# Patient Record
(demographics unavailable — no encounter records)

---

## 2024-10-17 NOTE — PHYSICAL EXAM
[de-identified] : bilateral breasts soft and symmetrical no signs of infection or palpable fluid collections noted nipples viable drains in place and functioning, drain sites without erythema, drainage serosanguinous  incisions c/d/i breast skin flaps with normal capillary refill and warm surgical tape and glue intact minimal swelling [de-identified] : incisions c/d/i abdomen soft and nontender umbilicus viable surgical tape intact drains in place and functioning, drain sites without erythema, drainage serosanguinous  no palpable fluid collections or signs of infection noted skin with normal capillary refill, skin warm to the touch

## 2024-10-17 NOTE — ASSESSMENT
[FreeTextEntry1] : Patient is doing well and healing as expected Restrictions discussed with patient today. Restrictions include limiting upper extremity stretching or movements, no heavy lifting (> 10 pounds), no side sleeping for 3-4 weeks, no strenuous activities or exercise, no swimming Patient may shower Walking strongly encouraged All drains removed today without difficulties. Site was covered with gauze and tegaderm. Patient may shower normally and can remove waterproof bandage in 2 days. After 2 days, the site may be covered with a small amount of bacitracin and bandaid for an additional couple days after to ensure healing. monitor for redness, swelling, fever, chills no heavy lifting or strenuous activity continue to wear soft, non wire bra she is encouraged to call if there are any changes RTO in 2 weeks all pt questions answered

## 2024-10-17 NOTE — PHYSICAL EXAM
[de-identified] : bilateral breasts soft and symmetrical no signs of infection or palpable fluid collections noted nipples viable drains in place and functioning, drain sites without erythema, drainage serosanguinous  incisions c/d/i breast skin flaps with normal capillary refill and warm surgical tape and glue intact minimal swelling [de-identified] : incisions c/d/i abdomen soft and nontender umbilicus viable surgical tape intact drains in place and functioning, drain sites without erythema, drainage serosanguinous  no palpable fluid collections or signs of infection noted skin with normal capillary refill, skin warm to the touch

## 2024-10-17 NOTE — HISTORY OF PRESENT ILLNESS
[FreeTextEntry1] : Ms. GRACE GRAY  is a 60 year  old female  with past medical history of a thyroid mass, HTN and high cholesterol who presents with newly diagnosed left breast cancer.  Pt was diagnosed on a screening mammogram August 2024, and later confirmed with ultrasound guided core biopsy 8/5/24. pt was referred to the office by Dr Pelayo to discuss her reconstructive options.   She is now s/p bilateral breast reconstruction with YESICA flaps Doing well Drains: all < 20 cc

## 2024-10-19 NOTE — PROCEDURE
[FreeTextEntry3] : Ultrasound-guided placement of Marissa localizing clip into the left axillary lymph node  After informed consent obtained, 1% lidocaine was infiltrated to the skin and a Marissa localizing clip was placed into the left axillary lymph node  No procedure related complications were noted

## 2024-10-19 NOTE — HISTORY OF PRESENT ILLNESS
[FreeTextEntry1] : Patient returns to the office for interval assessment  She has a history for left breast cancer with metastasis to left axillary lymph node  A Marissa localizing clip will be placed into the left axillary lymph node for facilitation of node dissection  Care plan reviewed  The relative risks and benefits were discussed

## 2024-10-19 NOTE — PHYSICAL EXAM
[Normocephalic] : normocephalic [Sclera nonicteric] : sclera nonicteric [No Supraclavicular Adenopathy] : no supraclavicular adenopathy [Clear to Auscultation Bilat] : clear to auscultation bilaterally [No Rashes] : no rashes [No Ulceration] : no ulceration [de-identified] : Palpable mass lower outer left breast

## 2024-10-19 NOTE — PHYSICAL EXAM
[Normocephalic] : normocephalic [Sclera nonicteric] : sclera nonicteric [No Supraclavicular Adenopathy] : no supraclavicular adenopathy [Clear to Auscultation Bilat] : clear to auscultation bilaterally [No Rashes] : no rashes [No Ulceration] : no ulceration [de-identified] : Palpable mass lower outer left breast

## 2024-10-19 NOTE — ASSESSMENT
[FreeTextEntry1] : Left breast cancer with metastasis to left axillary lymph node  A Marissa localizing clip was placed into the metastatic node  No procedure related complications were noted  The relative risks and benefits of surgery were discussed  A total of 45 minutes was spent in consultation

## 2024-10-21 NOTE — HISTORY OF PRESENT ILLNESS
[FreeTextEntry1] : Ms. Elizalde presents today for consideration for radiation therapy for breast cancer, referred by Dr. Pelayo.  Bilateral breast ultrasound done on 7/31/2024 showed: In the area of palpable concern in the left 4 oclock axis, 5-7 cm from the nipple there are 2 irregular hypoechoic solid masses measuring 1.5 and 1.7 cm with suspicious morphologic features corresponding with the spiculated masses on mammography. Abnormal hypoechoic left axillary node.   8/5/2024 left breast biopsy showed: 1.  Breast, core biopsy (LEFT, 5:00):      -   INFILTRATING DUCTAL CARCINOMA, Disha score 3+ 2+ 2 = 7/9         ER/[place]+, Her2 neg. 2.  Lymph node, core biopsy (LEFT axillary):      -   METASTATIC CARCINOMA.  PET scan 8/20/2024 showed: 1. FDG avid left breast cancer and metastatic left axillary lymphadenopathy. No distant FDG avid disease. 2. Incidental note of FDG avid nodule in the region of the posterior left thyroid. Recommend correlation with ultrasound to evaluate for signs of thyroid malignancy.  thyroid ultrasound 8/28/2024 showed: Multiple thyroid nodules consistent with multinodular goiter. A 1.3 cm isoechoic nodule in the mid to lower pole of the left lobe which likely corresponds to the FDG avid lesion seen on the prior PET/CT. Since it is FDG avid, consider further evaluation with ultrasound-guided fine-needle aspiration  10/10/2024 she underwent bilateral total mastectomy (Dr Pelayo) and breast reconstruction with YESICA flaps (Dr Benito). Pathology revealed: 1.  Breast, right, total mastectomy-  Benign 2.  Lymph node, left axilla, biopsy -   One lymph node negative for metastatic carcinoma (0/1). 3.  Axillary fat pad, left, dissection -   Four of twelve lymph nodes positive for metastatic carcinoma (4/12). -   The largest metastatic focus measures 22.0 mm in greatest dimension. -   Extranodal extension is present (extent 5.0 mm). 4.  Breast, left, posterior margin, biopsy -   Benign fibroadipose tissue. 5.  Breast, left, total mastectomy -   Invasive ductal carcinoma, Disha grade 2 (tubule formation: 3, nuclear pleomorphism: 2, mitotic rate: 2; total score 7/9). -   The invasive tumor measures 37.0 mm in greatest dimension. -   Ductal carcinoma in situ, nuclear grade 2, cribriform, micropapillary and papillary types with necrosis and calcifications.  The DCIS spans a distance of approximately 40.0 mm in greatest dimension and is present in 4 of 9 slides. -   No lymphovascular invasion identified. -   The resection margins are negative for carcinoma.  The invasive tumor is 2.0 mm from the nearest margin (posterior) and 3.0 mm from the anterior margin.  DCIS is 6.0 mm from the nearest margin (posterior). -   Nipple and skin negative for carcinoma. -   Complex sclerosing lesion, focally involved by DCIS. -   Intraductal papillomata, focally involved by DCIS. 6.  Internal mammary lymph node, right, biopsy -   Three lymph nodes negative for metastatic carcinoma (0/3). 7.  Internal mammary lymph node, left, biopsy -   One lymph node negative for metastatic carcinoma (0/1).  To see Dr. Hernandez of med onc on 11/5.   Feeling well.  Only complaint is of postsurgical tenderness.  Using a pillow for splinting as needed.  awaiting clearance from Dr Pelayo to begin ROJM.  right arm > left arm.   Tylenol alt with ibuprofen q 4 hrs. x 3 days completed.  antibiotic regiment completed.  Currently using Tylenol ER  500mg 2 tabs PO 1-2 x daily with good relief.  sleeping in recliner with pillow to splint.  Reviewed with patient proper technique for using Incentive spirometer.  Teach back was correct.

## 2024-10-21 NOTE — PHYSICAL EXAM
[Symmetric] : breasts are symmetric [Breast Palpation Mass] : no palpable masses [No UE Edema] : there is no upper extremity edema [Normal] : oriented to person, place and time, the affect was normal, the mood was normal and not anxious [de-identified] : Bilateral mastectomy with YESICA reconstruction.

## 2024-10-21 NOTE — PHYSICAL EXAM
[Symmetric] : breasts are symmetric [Breast Palpation Mass] : no palpable masses [No UE Edema] : there is no upper extremity edema [Normal] : oriented to person, place and time, the affect was normal, the mood was normal and not anxious [de-identified] : Bilateral mastectomy with YESICA reconstruction.

## 2024-10-21 NOTE — HISTORY OF PRESENT ILLNESS
[FreeTextEntry1] : Ms. Elizalde presents today for consideration for radiation therapy for breast cancer, referred by Dr. Pelayo.  Bilateral breast ultrasound done on 7/31/2024 showed: In the area of palpable concern in the left 4 oclock axis, 5-7 cm from the nipple there are 2 irregular hypoechoic solid masses measuring 1.5 and 1.7 cm with suspicious morphologic features corresponding with the spiculated masses on mammography. Abnormal hypoechoic left axillary node.   8/5/2024 left breast biopsy showed: 1.  Breast, core biopsy (LEFT, 5:00):      -   INFILTRATING DUCTAL CARCINOMA, Disha score 3+ 2+ 2 = 7/9         ER/UT+, Her2 neg. 2.  Lymph node, core biopsy (LEFT axillary):      -   METASTATIC CARCINOMA.  PET scan 8/20/2024 showed: 1. FDG avid left breast cancer and metastatic left axillary lymphadenopathy. No distant FDG avid disease. 2. Incidental note of FDG avid nodule in the region of the posterior left thyroid. Recommend correlation with ultrasound to evaluate for signs of thyroid malignancy.  thyroid ultrasound 8/28/2024 showed: Multiple thyroid nodules consistent with multinodular goiter. A 1.3 cm isoechoic nodule in the mid to lower pole of the left lobe which likely corresponds to the FDG avid lesion seen on the prior PET/CT. Since it is FDG avid, consider further evaluation with ultrasound-guided fine-needle aspiration  10/10/2024 she underwent bilateral total mastectomy (Dr Pelayo) and breast reconstruction with YESICA flaps (Dr Benito). Pathology revealed: 1.  Breast, right, total mastectomy-  Benign 2.  Lymph node, left axilla, biopsy -   One lymph node negative for metastatic carcinoma (0/1). 3.  Axillary fat pad, left, dissection -   Four of twelve lymph nodes positive for metastatic carcinoma (4/12). -   The largest metastatic focus measures 22.0 mm in greatest dimension. -   Extranodal extension is present (extent 5.0 mm). 4.  Breast, left, posterior margin, biopsy -   Benign fibroadipose tissue. 5.  Breast, left, total mastectomy -   Invasive ductal carcinoma, Disha grade 2 (tubule formation: 3, nuclear pleomorphism: 2, mitotic rate: 2; total score 7/9). -   The invasive tumor measures 37.0 mm in greatest dimension. -   Ductal carcinoma in situ, nuclear grade 2, cribriform, micropapillary and papillary types with necrosis and calcifications.  The DCIS spans a distance of approximately 40.0 mm in greatest dimension and is present in 4 of 9 slides. -   No lymphovascular invasion identified. -   The resection margins are negative for carcinoma.  The invasive tumor is 2.0 mm from the nearest margin (posterior) and 3.0 mm from the anterior margin.  DCIS is 6.0 mm from the nearest margin (posterior). -   Nipple and skin negative for carcinoma. -   Complex sclerosing lesion, focally involved by DCIS. -   Intraductal papillomata, focally involved by DCIS. 6.  Internal mammary lymph node, right, biopsy -   Three lymph nodes negative for metastatic carcinoma (0/3). 7.  Internal mammary lymph node, left, biopsy -   One lymph node negative for metastatic carcinoma (0/1).  To see Dr. Hernandez of med onc on 11/5.   Feeling well.  Only complaint is of postsurgical tenderness.  Using a pillow for splinting as needed.  awaiting clearance from Dr Pelayo to begin ROJM.  right arm > left arm.   Tylenol alt with ibuprofen q 4 hrs. x 3 days completed.  antibiotic regiment completed.  Currently using Tylenol ER  500mg 2 tabs PO 1-2 x daily with good relief.  sleeping in recliner with pillow to splint.  Reviewed with patient proper technique for using Incentive spirometer.  Teach back was correct.

## 2024-10-21 NOTE — DISEASE MANAGEMENT
[Pathological] : TNM Stage: p [IIA] : IIA [FreeTextEntry4] : LEFT breast cancer, ER/CA+, Her2 neg [TTNM] : 2 [NTNM] : 2a [MTNM] : 0

## 2024-10-21 NOTE — DISEASE MANAGEMENT
[Pathological] : TNM Stage: p [IIA] : IIA [FreeTextEntry4] : LEFT breast cancer, ER/MD+, Her2 neg [TTNM] : 2 [NTNM] : 2a [MTNM] : 0

## 2024-10-21 NOTE — VITALS
[Least Pain Intensity: 0/10] : 0/10 [OTC] : OTC [Date: ____________] : Patient's last distress assessment performed on [unfilled]. [0 - No Distress] : Distress Level: 0 [Maximal Pain Intensity: 3/10] : 3/10 [80: Normal activity with effort; some signs or symptoms of disease.] : 80: Normal activity with effort; some signs or symptoms of disease.

## 2024-10-31 NOTE — ASSESSMENT
[FreeTextEntry1] : Patient is doing well and healing as expected discussed sending patient for left breast US for seroma evaluation  Bilateral cook doppler wires removed today without difficulties Restrictions discussed with patient today. Restrictions include limiting upper extremity stretching or movements, no heavy lifting (> 10 pounds), no side sleeping for 3-4 weeks, no strenuous activities or exercise, no swimming Patient may shower Walking strongly encouraged monitor for redness, swelling, fever, chills no heavy lifting or strenuous activity continue to wear soft, non wire bra she is encouraged to call if there are any changes RTO in 3 weeks all pt questions answered

## 2024-10-31 NOTE — HISTORY OF PRESENT ILLNESS
[FreeTextEntry1] : Ms. GRACE GRAY  is a 60 year  old female  with past medical history of a thyroid mass, HTN and high cholesterol who presents with newly diagnosed left breast cancer.  Pt was diagnosed on a screening mammogram August 2024, and later confirmed with ultrasound guided core biopsy 8/5/24. pt was referred to the office by Dr Pelayo to discuss her reconstructive options.   She is now s/p bilateral breast reconstruction with YESICA flaps Doing well, she is 3 weeks post op today Here for cook doppler wire removals

## 2024-10-31 NOTE — PHYSICAL EXAM
[NI] : Normal [de-identified] : bilateral breasts soft  left breast slightly larger than right no signs of infection or palpable fluid collections noted nipples viable incisions c/d/i breast skin flaps with normal capillary refill and warm surgical tape and glue intact minimal swelling [de-identified] : incisions c/d/i abdomen soft and nontender umbilicus viable surgical tape intact no palpable fluid collections or signs of infection noted skin with normal capillary refill, skin warm to the touch

## 2024-10-31 NOTE — PHYSICAL EXAM
[NI] : Normal [de-identified] : bilateral breasts soft  left breast slightly larger than right no signs of infection or palpable fluid collections noted nipples viable incisions c/d/i breast skin flaps with normal capillary refill and warm surgical tape and glue intact minimal swelling [de-identified] : incisions c/d/i abdomen soft and nontender umbilicus viable surgical tape intact no palpable fluid collections or signs of infection noted skin with normal capillary refill, skin warm to the touch

## 2024-11-06 NOTE — PHYSICAL EXAM
[Obese] : obese [Normal] : affect appropriate [de-identified] : no lymphedema  [de-identified] : s/p bilat mastectomies with YESICA reconstruction and L ALND - incisions healing well  [de-identified] : low transverse incision healing well  [de-identified] : no lymphedema  [de-identified] : Limping slightly - twisted R knee two days ago ( improving)  - has soft brace

## 2024-11-06 NOTE — ASSESSMENT
[FreeTextEntry1] : 59 y/o female with left breast cancer - invasive ductal  mod diff strongly ER and [place] positive, HER 2 negative , metastatic to 4/ 17  axillary  LNs with extracapsular extension p T2  pN2a  prognostic stage II A s/p bilateral mastetcomies L ALND and YESICA reconstruction on 10/10/24 ( Dr Pelayo, Dr Benito)   Discussed diagnosis, prognosis, explained relatively high risk of systemic recurrence and rationale, benefits of adjuvant systemic therapy. Discusse side efefcts of chemotherapy, ligistics, schedules, monitoring.  Plan :  Adjuvant chemotherapy DD AC-T followed by adjuvant  radiation, hormonal therapy - AI x 10 yeras and CDK4/6 inhibitor x 2-3 years. Plan to start chemotherapy in about 2 weeks.  echocardiogram [facility]  Chemotherapy education meeting  Nutritionist referral  ( patient was taking multiple supplements  and she has questions re best nutrition during chemotherapy)   D/w patient, her  and her daughter.    CC: Dr Homero Smith

## 2024-11-06 NOTE — HISTORY OF PRESENT ILLNESS
[Disease: _____________________] : Disease: [unfilled] [T: ___] : T[unfilled] [N: ___] : N[unfilled] [AJCC Stage: ____] : AJCC Stage: [unfilled] [de-identified] : 59 y/o female with recently diagnoseed let breast cancer. Presented with an abnormal screening mammogram   Presented in July 2024 with a palpable mass in the left breast. Mammogram  and sono 7/31/2024: In the area of palpable concern in the left 4 oclock axis, 5-7 cm from the nipple there are 2 irregular hypoechoic solid masses measuring 1.5 and 1.7 cm with suspicious morphologic features corresponding with the spiculated masses on mammography. Abnormal hypoechoic left axillary node.  8/5/2024 left breast core biopsy (LEFT, 5:00):  - INFILTRATING DUCTAL CARCINOMA, [place] score 3+ 2+ 2 = 7/9   ER/MS+, Her2 neg. 2. Lymph node, core biopsy (LEFT axillary):  - METASTATIC CARCINOMA.  PET scan 8/20/2024 showed: 1. FDG avid left breast cancer and metastatic left axillary lymphadenopathy. No distant FDG avid disease. 2. Incidental note of FDG avid nodule in the region of the posterior left thyroid. Recommend correlation with ultrasound to evaluate for signs of thyroid malignancy.  thyroid ultrasound 8/28/2024 :  Multiple thyroid nodules consistent with multinodular goiter. A 1.3 cm isoechoic nodule in the mid to lower pole of the left lobe which likely corresponds to the FDG avid lesion seen on the prior PET/CT. Since it is FDG avid, consider further evaluation with ultrasound-guided fine-needle aspiration  10/10/2024 she underwent bilateral  mastectomy (Dr Pelayo) and breast reconstruction with YESICA flaps (Dr Benito).  Pathology revealed: Breast, right, total mastectomy- Benign Lymph node, left axilla, biopsy - One lymph node negative for metastatic carcinoma (0/1). Axillary fat pad, left, dissection  - Four of twelve lymph nodes positive for metastatic carcinoma  (4/12). - The largest metastatic focus measures 22.0 mm in greatest dimension. - Extranodal extension is present (extent 5.0 mm). Breast, left, total mastectomy - Invasive ductal carcinoma, [place] grade 2 (tubule formation: 3, nuclear pleomorphism: 2, mitotic rate: 2; total score 7/9). - The invasive tumor measures 37.0 mm in greatest dimension. - Ductal carcinoma in situ, nuclear grade 2, cribriform, micropapillary and papillary types with necrosis and calcifications.  - The resection margins are negative for carcinoma. The invasive tumor is 2.0 mm from the nearest margin (posterior) and 3.0 mm from the anterior margin. DCIS is 6.0 mm from the nearest margin (posterior). Internal mammary lymph node, right, biopsy:   Internal mammary lymph node, left, biopsy - One lymph node negative for metastatic carcinoma (0/1).  Healing well after surgery. Overall feels well. Medical Hx : HTN and hyperlipidemia. LIves at home with her . Works as a .     [de-identified] : invasive ductal cancer grade 2 ( 7/9)  [de-identified] : [place]   [place]   HER2  0  negative  [de-identified] : Genetic testing Ambry  negative

## 2024-11-06 NOTE — HISTORY OF PRESENT ILLNESS
[Disease: _____________________] : Disease: [unfilled] [T: ___] : T[unfilled] [N: ___] : N[unfilled] [AJCC Stage: ____] : AJCC Stage: [unfilled] [de-identified] : 61 y/o female with recently diagnoseed let breast cancer. Presented with an abnormal screening mammogram   Presented in July 2024 with a palpable mass in the left breast. Mammogram  and sono 7/31/2024: In the area of palpable concern in the left 4 oclock axis, 5-7 cm from the nipple there are 2 irregular hypoechoic solid masses measuring 1.5 and 1.7 cm with suspicious morphologic features corresponding with the spiculated masses on mammography. Abnormal hypoechoic left axillary node.  8/5/2024 left breast core biopsy (LEFT, 5:00):  - INFILTRATING DUCTAL CARCINOMA, [place] score 3+ 2+ 2 = 7/9   ER/CO+, Her2 neg. 2. Lymph node, core biopsy (LEFT axillary):  - METASTATIC CARCINOMA.  PET scan 8/20/2024 showed: 1. FDG avid left breast cancer and metastatic left axillary lymphadenopathy. No distant FDG avid disease. 2. Incidental note of FDG avid nodule in the region of the posterior left thyroid. Recommend correlation with ultrasound to evaluate for signs of thyroid malignancy.  thyroid ultrasound 8/28/2024 :  Multiple thyroid nodules consistent with multinodular goiter. A 1.3 cm isoechoic nodule in the mid to lower pole of the left lobe which likely corresponds to the FDG avid lesion seen on the prior PET/CT. Since it is FDG avid, consider further evaluation with ultrasound-guided fine-needle aspiration  10/10/2024 she underwent bilateral  mastectomy (Dr Pelayo) and breast reconstruction with YESICA flaps (Dr Benito).  Pathology revealed: Breast, right, total mastectomy- Benign Lymph node, left axilla, biopsy - One lymph node negative for metastatic carcinoma (0/1). Axillary fat pad, left, dissection  - Four of twelve lymph nodes positive for metastatic carcinoma  (4/12). - The largest metastatic focus measures 22.0 mm in greatest dimension. - Extranodal extension is present (extent 5.0 mm). Breast, left, total mastectomy - Invasive ductal carcinoma, [place] grade 2 (tubule formation: 3, nuclear pleomorphism: 2, mitotic rate: 2; total score 7/9). - The invasive tumor measures 37.0 mm in greatest dimension. - Ductal carcinoma in situ, nuclear grade 2, cribriform, micropapillary and papillary types with necrosis and calcifications.  - The resection margins are negative for carcinoma. The invasive tumor is 2.0 mm from the nearest margin (posterior) and 3.0 mm from the anterior margin. DCIS is 6.0 mm from the nearest margin (posterior). Internal mammary lymph node, right, biopsy:   Internal mammary lymph node, left, biopsy - One lymph node negative for metastatic carcinoma (0/1).  Healing well after surgery. Overall feels well. Medical Hx : HTN and hyperlipidemia. LIves at home with her . Works as a .     [de-identified] : invasive ductal cancer grade 2 ( 7/9)  [de-identified] : [place]   [place]   HER2  0  negative  [de-identified] : Genetic testing Ambry  negative

## 2024-11-06 NOTE — PHYSICAL EXAM
[Obese] : obese [Normal] : affect appropriate [de-identified] : no lymphedema  [de-identified] : s/p bilat mastectomies with YESICA reconstruction and L ALND - incisions healing well  [de-identified] : low transverse incision healing well  [de-identified] : no lymphedema  [de-identified] : Limping slightly - twisted R knee two days ago ( improving)  - has soft brace

## 2024-11-06 NOTE — ASSESSMENT
[FreeTextEntry1] : 61 y/o female with left breast cancer - invasive ductal  mod diff strongly ER and [place] positive, HER 2 negative , metastatic to 4/ 17  axillary  LNs with extracapsular extension p T2  pN2a  prognostic stage II A s/p bilateral mastetcomies L ALND and YESICA reconstruction on 10/10/24 ( Dr Pelayo, Dr Benito)   Discussed diagnosis, prognosis, explained relatively high risk of systemic recurrence and rationale, benefits of adjuvant systemic therapy. Discusse side efefcts of chemotherapy, ligistics, schedules, monitoring.  Plan :  Adjuvant chemotherapy DD AC-T followed by adjuvant  radiation, hormonal therapy - AI x 10 yeras and CDK4/6 inhibitor x 2-3 years. Plan to start chemotherapy in about 2 weeks.  echocardiogram [facility]  Chemotherapy education meeting  Nutritionist referral  ( patient was taking multiple supplements  and she has questions re best nutrition during chemotherapy)   D/w patient, her  and her daughter.    CC: Dr Homero Smith

## 2024-11-06 NOTE — ASSESSMENT
[FreeTextEntry1] : 59 y/o female with left breast cancer - invasive ductal  mod diff strongly ER and [place] positive, HER 2 negative , metastatic to 4/ 17  axillary  LNs with extracapsular extension p T2  pN2a  prognostic stage II A s/p bilateral mastetcomies L ALND and YESICA reconstruction on 10/10/24 ( Dr Pelayo, Dr Benito)   Discussed diagnosis, prognosis, explained relatively high risk of systemic recurrence and rationale, benefits of adjuvant systemic therapy. Discusse side efefcts of chemotherapy, ligistics, schedules, monitoring.  Plan :  Adjuvant chemotherapy DD AC-T followed by adjuvant  radiation, hormonal therapy - AI x 10 yeras and CDK4/6 inhibitor x 2-3 years. Plan to start chemotherapy in about 2 weeks.  echocardiogram [facility]  Chemotherapy education meeting  Nutritionist referral  ( patient was taking multiple supplements  and she has questions re best nutrition during chemotherapy)   D/w patient, her  and her daughter.    CC: Dr Homero Smith

## 2024-11-06 NOTE — REASON FOR VISIT
[Initial Consultation] : an initial consultation [Spouse] : spouse [Other: _____] : [unfilled] [FreeTextEntry2] : breast cancer  here to discuss adjuvant therapy

## 2024-11-06 NOTE — PHYSICAL EXAM
[Obese] : obese [Normal] : affect appropriate [de-identified] : no lymphedema  [de-identified] : s/p bilat mastectomies with YESICA reconstruction and L ALND - incisions healing well  [de-identified] : low transverse incision healing well  [de-identified] : no lymphedema  [de-identified] : Limping slightly - twisted R knee two days ago ( improving)  - has soft brace

## 2024-11-06 NOTE — REVIEW OF SYSTEMS
[Diarrhea: Grade 0] : Diarrhea: Grade 0 [Negative] : Allergic/Immunologic [FreeTextEntry9] : R knee pain x two days- improving ( twisted her knee)

## 2024-11-06 NOTE — CONSULT LETTER
[Dear  ___] : Dear ~FAUZIA, [( Thank you for referring [unfilled] for consultation for _____ )] : Thank you for referring [unfilled] for consultation for [unfilled] [Please see my note below.] : Please see my note below. [Consult Closing:] : Thank you very much for allowing me to participate in the care of this patient.  If you have any questions, please do not hesitate to contact me. [Sincerely,] : Sincerely, [FreeTextEntry2] : Dr Leno Pelayo [FreeTextEntry3] : Jany Cueva

## 2024-11-06 NOTE — HISTORY OF PRESENT ILLNESS
[Disease: _____________________] : Disease: [unfilled] [T: ___] : T[unfilled] [N: ___] : N[unfilled] [AJCC Stage: ____] : AJCC Stage: [unfilled] [de-identified] : 59 y/o female with recently diagnoseed let breast cancer. Presented with an abnormal screening mammogram   Presented in July 2024 with a palpable mass in the left breast. Mammogram  and sono 7/31/2024: In the area of palpable concern in the left 4 oclock axis, 5-7 cm from the nipple there are 2 irregular hypoechoic solid masses measuring 1.5 and 1.7 cm with suspicious morphologic features corresponding with the spiculated masses on mammography. Abnormal hypoechoic left axillary node.  8/5/2024 left breast core biopsy (LEFT, 5:00):  - INFILTRATING DUCTAL CARCINOMA, [place] score 3+ 2+ 2 = 7/9   ER/[place]+, Her2 neg. 2. Lymph node, core biopsy (LEFT axillary):  - METASTATIC CARCINOMA.  PET scan 8/20/2024 showed: 1. FDG avid left breast cancer and metastatic left axillary lymphadenopathy. No distant FDG avid disease. 2. Incidental note of FDG avid nodule in the region of the posterior left thyroid. Recommend correlation with ultrasound to evaluate for signs of thyroid malignancy.  thyroid ultrasound 8/28/2024 :  Multiple thyroid nodules consistent with multinodular goiter. A 1.3 cm isoechoic nodule in the mid to lower pole of the left lobe which likely corresponds to the FDG avid lesion seen on the prior PET/CT. Since it is FDG avid, consider further evaluation with ultrasound-guided fine-needle aspiration  10/10/2024 she underwent bilateral  mastectomy (Dr Pelayo) and breast reconstruction with YESICA flaps (Dr Benito).  Pathology revealed: Breast, right, total mastectomy- Benign Lymph node, left axilla, biopsy - One lymph node negative for metastatic carcinoma (0/1). Axillary fat pad, left, dissection  - Four of twelve lymph nodes positive for metastatic carcinoma  (4/12). - The largest metastatic focus measures 22.0 mm in greatest dimension. - Extranodal extension is present (extent 5.0 mm). Breast, left, total mastectomy - Invasive ductal carcinoma, [place] grade 2 (tubule formation: 3, nuclear pleomorphism: 2, mitotic rate: 2; total score 7/9). - The invasive tumor measures 37.0 mm in greatest dimension. - Ductal carcinoma in situ, nuclear grade 2, cribriform, micropapillary and papillary types with necrosis and calcifications.  - The resection margins are negative for carcinoma. The invasive tumor is 2.0 mm from the nearest margin (posterior) and 3.0 mm from the anterior margin. DCIS is 6.0 mm from the nearest margin (posterior). Internal mammary lymph node, right, biopsy:   Internal mammary lymph node, left, biopsy - One lymph node negative for metastatic carcinoma (0/1).  Healing well after surgery. Overall feels well. Medical Hx : HTN and hyperlipidemia. LIves at home with her . Works as a .     [de-identified] : invasive ductal cancer grade 2 ( 7/9)  [de-identified] : ER   MT   HER2  0  negative  [de-identified] : Genetic testing Ambry  negative

## 2024-11-06 NOTE — CONSULT LETTER
[Dear  ___] : Dear ~FAUZIA, [( Thank you for referring [unfilled] for consultation for _____ )] : Thank you for referring [unfilled] for consultation for [unfilled] Detail Level: Detailed [Please see my note below.] : Please see my note below. Detail Level: Simple [Consult Closing:] : Thank you very much for allowing me to participate in the care of this patient.  If you have any questions, please do not hesitate to contact me. [Sincerely,] : Sincerely, [FreeTextEntry2] : Dr Leno Pelayo [FreeTextEntry3] : Jany Cueva

## 2024-11-20 NOTE — HISTORY OF PRESENT ILLNESS
[FreeTextEntry1] : Ms. GRACE GRAY  is a 60 year  old female  with past medical history of a thyroid mass, HTN and high cholesterol who presents with newly diagnosed left breast cancer.  Pt was diagnosed on a screening mammogram August 2024, and later confirmed with ultrasound guided core biopsy 8/5/24. pt was referred to the office by Dr Pelayo to discuss her reconstructive options.   She is now s/p bilateral breast reconstruction with YESICA flaps Doing well, she is 6 weeks post op today

## 2024-11-20 NOTE — ASSESSMENT
[FreeTextEntry1] : Patient is doing well and healing as expected Patient to undergo chemotherapy sessions/radiation  All surgical tape and glue removed today with gentle adhesive remover wipes. Extra wipes given to patient to use following a shower if they continue to feel tacky from residual glue Discussed scar massages twice daily for patient to perform on their own using their preferred lotion or scar cream. Massage all incisions twice daily using firm pressure in a circular motion with lotion or in the shower. Perform massage for 5 minutes in the morning and 5 minutes in the evening for at least 6-8 weeks. Advised patient that if they will be in direct sunlight to use SPF 30 or higher over all of the healing incisions to prevent color changes. monitor for redness, swelling, fever, chills Patient without restrictions at this time and may proceed with their normal daily activities Patient may wear whatever bra they choose  she is encouraged to call if there are any changes RTO in 3 months or sooner if needed for check in/discussion of revision options  all pt questions answered

## 2024-11-20 NOTE — PHYSICAL EXAM
[NI] : Normal [de-identified] : bilateral breasts soft  left breast slightly larger than right no signs of infection or palpable fluid collections noted nipples viable incisions c/d/i breast skin flaps with normal capillary refill and warm surgical tape and glue intact minimal swelling [de-identified] : incisions c/d/i abdomen soft and nontender umbilicus viable surgical tape intact no palpable fluid collections or signs of infection noted skin with normal capillary refill, skin warm to the touch

## 2024-11-27 NOTE — PHYSICAL EXAM
[Obese] : obese [de-identified] : s/p bilat mastectomies with YESICA reconstruction and L ALND - incisions healing well  [de-identified] : low transverse incision healing well  [de-identified] : Limping slightly - twisted R knee two days ago ( improving)  - has soft brace  [Normal] : well developed, well nourished, in no acute distress [de-identified] : anicteric [de-identified] : no c/c/e, right port without s/s infection [de-identified] : no lymphedema  [de-identified] : no rashes

## 2024-11-27 NOTE — RESULTS/DATA
[FreeTextEntry1] : WBC: 1.67, ANC: 0.17, Hgb: 13.1, Hct: 39.1, MCV: 89, Plts: 189 CMP: pending  11/5/24, 11/19/24: AST: 47 -> 91; ALT 70 -> 109

## 2024-11-27 NOTE — HISTORY OF PRESENT ILLNESS
[Disease: _____________________] : Disease: [unfilled] [T: ___] : T[unfilled] [N: ___] : N[unfilled] [AJCC Stage: ____] : AJCC Stage: [unfilled] [de-identified] :  GRACE GRAY is a 60 y.o. F with no significant PMH, who we are following for an ER+, HER2-, left sided T2, N2a, prognostic stage IIA ductal breast cancer.   Prior mammo 2022 reportedly showed a finding in the left breast (Preet). Mother had breast cancer at age 32. No genetic testing was performed. 7/31/24 - Mammo/Sono - 2 spiculated masses in the left LOQ - 1.5 and 1.8cm, corresponding with irregular hypoechoic masses in sono. Masses are ~2cm apart with a complex cystic mass and heterogeneous calcifications between them. The abnormal findings span a distance of ~ 4.5cm.  Abnormal hypoechoic left axillary LN.  8/5/24 - Left breast core biopsy 5:00 and left axillary LN - infiltrating ductal carcinoma (7/9), ER > 95%, [place] > 95%, HER2 neg; LEFT axillary LN - metastatic carcinoma.  8/20/24 - PET/CT - FDG avid left breast cancer and metastatic left axillary lymphadenopathy. No distant FDG avid disease. Incidental note of FDG avid nodule in the region of the posterior left thyroid. 8/28/24 - Thyroid US - Multiple thyroid nodules consistent with multinodular goiter. A 1.3 cm isoechoic nodule in the mid to lower pole of the left lobe which likely corresponds to the FDG avid lesion seen on the prior PET/CT.    10/10/24 - Bilateral mastectomy (Dr. Pelayo) and breast reconstruction with YESICA flaps (Dr. Benito). PATH - Right breast - Benign.  Left breast - 3.7cm Invasive ductal carcinoma (7/9), grade 2, with ductal carcinoma in situ, nuclear grade 2, cribriform, micropapillary and papillary types with necrosis and calcifications. Margins are negative. The invasive tumor is 2.0 mm from the nearest margin (posterior) and 3.0 mm from the anterior margin.   4/14 LN's positive, largest metastatic focus 2.2 cm in greatest dimension. + Extranodal extension (extent 5.0 mm).  Plan: Adjuvant chemotherapy DDAC -> T followed by adjuvant radiation, hormonal therapy with AI x 10 years and CDK4/6 inhibitor x 2-3 years. 11/19/21 - Started DDAC [de-identified] : invasive ductal cancer, grade 2 (7/9)  [de-identified] : ER > 95%, SD > 95%, HER2 neg [de-identified] : 8/6/24 - [facility] 71 gene panel - negative (VUS KIF1B - associated with pheochromocytoma and paragangliomas) [de-identified] : Patient D8 C1 DDAC Did well nite of D1 and D2. Nite of D3 felt flu-like.  Started to have nausea and headaches.  D4 slept all day.  Started to feel better on D5.  Also had nausea on D4 - called service and was given Rx for Zofran - states hasn't tried yet as by that time the Compazine had kicked in.  Has been taking Omeprazole daily. Has been having constipation alternating with diarrhea - states manageable.  Used Zahra caps.   Denies any changes in her family, medical, or social history since her last visit of 11/5/24.  [Date: ____________] : Patient's last distress assessment performed on [unfilled]. [1 - Distress Level] : Distress Level: 1

## 2024-11-27 NOTE — REVIEW OF SYSTEMS
[Diarrhea: Grade 0] : Diarrhea: Grade 0 [FreeTextEntry9] : R knee pain x two days- improving ( twisted her knee)  [Patient Intake Form Reviewed] : Patient intake form was reviewed [Fatigue] : fatigue [Joint Pain] : joint pain [Negative] : Musculoskeletal [FreeTextEntry7] : loss of appetite, nausea, constipation and diarrhea - see interval history [de-identified] : headaches

## 2024-11-27 NOTE — PHYSICAL EXAM
[Obese] : obese [de-identified] : s/p bilat mastectomies with YESICA reconstruction and L ALND - incisions healing well  [de-identified] : low transverse incision healing well  [de-identified] : Limping slightly - twisted R knee two days ago ( improving)  - has soft brace  [Normal] : well developed, well nourished, in no acute distress [de-identified] : anicteric [de-identified] : no c/c/e, right port without s/s infection [de-identified] : no lymphedema  [de-identified] : no rashes

## 2024-11-27 NOTE — REVIEW OF SYSTEMS
[Diarrhea: Grade 0] : Diarrhea: Grade 0 [FreeTextEntry9] : R knee pain x two days- improving ( twisted her knee)  [Patient Intake Form Reviewed] : Patient intake form was reviewed [Fatigue] : fatigue [Joint Pain] : joint pain [Negative] : Musculoskeletal [FreeTextEntry7] : loss of appetite, nausea, constipation and diarrhea - see interval history [de-identified] : headaches

## 2024-11-27 NOTE — HISTORY OF PRESENT ILLNESS
[Disease: _____________________] : Disease: [unfilled] [T: ___] : T[unfilled] [N: ___] : N[unfilled] [AJCC Stage: ____] : AJCC Stage: [unfilled] [de-identified] :  GRACE GRAY is a 60 y.o. F with no significant PMH, who we are following for an ER+, HER2-, left sided T2, N2a, prognostic stage IIA ductal breast cancer.   Prior mammo 2022 reportedly showed a finding in the left breast (Preet). Mother had breast cancer at age 32. No genetic testing was performed. 7/31/24 - Mammo/Sono - 2 spiculated masses in the left LOQ - 1.5 and 1.8cm, corresponding with irregular hypoechoic masses in sono. Masses are ~2cm apart with a complex cystic mass and heterogeneous calcifications between them. The abnormal findings span a distance of ~ 4.5cm.  Abnormal hypoechoic left axillary LN.  8/5/24 - Left breast core biopsy 5:00 and left axillary LN - infiltrating ductal carcinoma (7/9), ER > 95%, [place] > 95%, HER2 neg; LEFT axillary LN - metastatic carcinoma.  8/20/24 - PET/CT - FDG avid left breast cancer and metastatic left axillary lymphadenopathy. No distant FDG avid disease. Incidental note of FDG avid nodule in the region of the posterior left thyroid. 8/28/24 - Thyroid US - Multiple thyroid nodules consistent with multinodular goiter. A 1.3 cm isoechoic nodule in the mid to lower pole of the left lobe which likely corresponds to the FDG avid lesion seen on the prior PET/CT.    10/10/24 - Bilateral mastectomy (Dr. Pelayo) and breast reconstruction with YESICA flaps (Dr. Benito). PATH - Right breast - Benign.  Left breast - 3.7cm Invasive ductal carcinoma (7/9), grade 2, with ductal carcinoma in situ, nuclear grade 2, cribriform, micropapillary and papillary types with necrosis and calcifications. Margins are negative. The invasive tumor is 2.0 mm from the nearest margin (posterior) and 3.0 mm from the anterior margin.   4/14 LN's positive, largest metastatic focus 2.2 cm in greatest dimension. + Extranodal extension (extent 5.0 mm).  Plan: Adjuvant chemotherapy DDAC -> T followed by adjuvant radiation, hormonal therapy with AI x 10 years and CDK4/6 inhibitor x 2-3 years. 11/19/21 - Started DDAC [de-identified] : invasive ductal cancer, grade 2 (7/9)  [de-identified] : ER > 95%, ID > 95%, HER2 neg [de-identified] : 8/6/24 - [facility] 71 gene panel - negative (VUS KIF1B - associated with pheochromocytoma and paragangliomas) [de-identified] : Patient D8 C1 DDAC Did well nite of D1 and D2. Nite of D3 felt flu-like.  Started to have nausea and headaches.  D4 slept all day.  Started to feel better on D5.  Also had nausea on D4 - called service and was given Rx for Zofran - states hasn't tried yet as by that time the Compazine had kicked in.  Has been taking Omeprazole daily. Has been having constipation alternating with diarrhea - states manageable.  Used Zahra caps.   Denies any changes in her family, medical, or social history since her last visit of 11/5/24.  [Date: ____________] : Patient's last distress assessment performed on [unfilled]. [1 - Distress Level] : Distress Level: 1

## 2024-11-27 NOTE — REASON FOR VISIT
[Follow-Up Visit] : a follow-up [Other: _____] : [unfilled] [FreeTextEntry2] : Breast cancer - D8 C1 Adjuvant DDAC

## 2024-11-27 NOTE — ASSESSMENT
[FreeTextEntry1] : Patient is a 60 y.o. with an ER+, HER2-, left sided T2, N2a (+4/17 LN's with SIXTO), prognostic stage IIA ductal breast cancer, s/p bilateral mastectomies L ALND and YESICA reconstruction on 10/10/24. Plan: Adjuvant chemotherapy DD AC ->T followed by adjuvant RT, hormonal therapy with AI x 10 years and CDK4/6 inhibitor x 2-3 years. 11/19/21 - Started DDAC. Today D8 C1 Adjuvant DDAC. Patient tolerated as expected. Had some delayed nausea and chemo-induced fatigue.    Plan: 1. Onc - Neutropenia - s/p Onpro.  WBC should increase in next day or so.  Reminded may have low back/hip pains over next day.  Aware to call if should develop fevers.  2. Hair loss - Patient used Zahra caps.  States insurance may cover - needs letter sent to insurance co with diagnosis and ICD codes.  3. LFT's - Noted to be elevated on D1 - will repeat today.   4. Nausea - was advised to start ondansetron proactively on D3/4 for delayed nausea - our IV medication (Aloxi) wears off at about this time and ondansetron is the PO equivalent.   Dr. Homero Smith (PCP) Dr. Leno Pelayo (Breast surgery) Dr. Bob Benito (Plastics) Dr. Natasha Leo (Rad/Onc)

## 2024-12-09 NOTE — HISTORY OF PRESENT ILLNESS
[Disease: _____________________] : Disease: [unfilled] [T: ___] : T[unfilled] [N: ___] : N[unfilled] [AJCC Stage: ____] : AJCC Stage: [unfilled] [de-identified] : 61 y/o female with recently diagnoseed let breast cancer. Presented with an abnormal screening mammogram   Presented in July 2024 with a palpable mass in the left breast. Mammogram  and sono 7/31/2024: In the area of palpable concern in the left 4 oclock axis, 5-7 cm from the nipple there are 2 irregular hypoechoic solid masses measuring 1.5 and 1.7 cm with suspicious morphologic features corresponding with the spiculated masses on mammography. Abnormal hypoechoic left axillary node.  8/5/2024 left breast core biopsy (LEFT, 5:00):  - INFILTRATING DUCTAL CARCINOMA, [place] score 3+ 2+ 2 = 7/9   ER/[place]+, Her2 neg. 2. Lymph node, core biopsy (LEFT axillary):  - METASTATIC CARCINOMA.  PET scan 8/20/2024 showed: 1. FDG avid left breast cancer and metastatic left axillary lymphadenopathy. No distant FDG avid disease. 2. Incidental note of FDG avid nodule in the region of the posterior left thyroid. Recommend correlation with ultrasound to evaluate for signs of thyroid malignancy.  thyroid ultrasound 8/28/2024 :  Multiple thyroid nodules consistent with multinodular goiter. A 1.3 cm isoechoic nodule in the mid to lower pole of the left lobe which likely corresponds to the FDG avid lesion seen on the prior PET/CT. Since it is FDG avid, consider further evaluation with ultrasound-guided fine-needle aspiration  10/10/2024 she underwent bilateral  mastectomy (Dr Pelayo) and breast reconstruction with YESICA flaps (Dr Benito).  Pathology revealed: Breast, right, total mastectomy- Benign Lymph node, left axilla, biopsy - One lymph node negative for metastatic carcinoma (0/1). Axillary fat pad, left, dissection  - Four of twelve lymph nodes positive for metastatic carcinoma  (4/12). - The largest metastatic focus measures 22.0 mm in greatest dimension. - Extranodal extension is present (extent 5.0 mm). Breast, left, total mastectomy - Invasive ductal carcinoma, [place] grade 2 (tubule formation: 3, nuclear pleomorphism: 2, mitotic rate: 2; total score 7/9). - The invasive tumor measures 37.0 mm in greatest dimension. - Ductal carcinoma in situ, nuclear grade 2, cribriform, micropapillary and papillary types with necrosis and calcifications.  - The resection margins are negative for carcinoma. The invasive tumor is 2.0 mm from the nearest margin (posterior) and 3.0 mm from the anterior margin. DCIS is 6.0 mm from the nearest margin (posterior). Internal mammary lymph node, right, biopsy:   Internal mammary lymph node, left, biopsy - One lymph node negative for metastatic carcinoma (0/1).   medical Hx : HTN and hyperlipidemia. LIves at home with her . Works as a .   11/19/24  started DD AC   [de-identified] : invasive ductal cancer grade 2 ( 7/9)  [de-identified] : ER   PA   HER2  0  negative  [de-identified] : Genetic testing Ambry  negative

## 2024-12-09 NOTE — HISTORY OF PRESENT ILLNESS
[Disease: _____________________] : Disease: [unfilled] [T: ___] : T[unfilled] [N: ___] : N[unfilled] [AJCC Stage: ____] : AJCC Stage: [unfilled] [de-identified] : 61 y/o female with recently diagnoseed let breast cancer. Presented with an abnormal screening mammogram   Presented in July 2024 with a palpable mass in the left breast. Mammogram  and sono 7/31/2024: In the area of palpable concern in the left 4 oclock axis, 5-7 cm from the nipple there are 2 irregular hypoechoic solid masses measuring 1.5 and 1.7 cm with suspicious morphologic features corresponding with the spiculated masses on mammography. Abnormal hypoechoic left axillary node.  8/5/2024 left breast core biopsy (LEFT, 5:00):  - INFILTRATING DUCTAL CARCINOMA, [place] score 3+ 2+ 2 = 7/9   ER/[place]+, Her2 neg. 2. Lymph node, core biopsy (LEFT axillary):  - METASTATIC CARCINOMA.  PET scan 8/20/2024 showed: 1. FDG avid left breast cancer and metastatic left axillary lymphadenopathy. No distant FDG avid disease. 2. Incidental note of FDG avid nodule in the region of the posterior left thyroid. Recommend correlation with ultrasound to evaluate for signs of thyroid malignancy.  thyroid ultrasound 8/28/2024 :  Multiple thyroid nodules consistent with multinodular goiter. A 1.3 cm isoechoic nodule in the mid to lower pole of the left lobe which likely corresponds to the FDG avid lesion seen on the prior PET/CT. Since it is FDG avid, consider further evaluation with ultrasound-guided fine-needle aspiration  10/10/2024 she underwent bilateral  mastectomy (Dr Pelayo) and breast reconstruction with YESICA flaps (Dr Benito).  Pathology revealed: Breast, right, total mastectomy- Benign Lymph node, left axilla, biopsy - One lymph node negative for metastatic carcinoma (0/1). Axillary fat pad, left, dissection  - Four of twelve lymph nodes positive for metastatic carcinoma  (4/12). - The largest metastatic focus measures 22.0 mm in greatest dimension. - Extranodal extension is present (extent 5.0 mm). Breast, left, total mastectomy - Invasive ductal carcinoma, [place] grade 2 (tubule formation: 3, nuclear pleomorphism: 2, mitotic rate: 2; total score 7/9). - The invasive tumor measures 37.0 mm in greatest dimension. - Ductal carcinoma in situ, nuclear grade 2, cribriform, micropapillary and papillary types with necrosis and calcifications.  - The resection margins are negative for carcinoma. The invasive tumor is 2.0 mm from the nearest margin (posterior) and 3.0 mm from the anterior margin. DCIS is 6.0 mm from the nearest margin (posterior). Internal mammary lymph node, right, biopsy:   Internal mammary lymph node, left, biopsy - One lymph node negative for metastatic carcinoma (0/1).   medical Hx : HTN and hyperlipidemia. LIves at home with her . Works as a .   11/19/24  started DD AC   [de-identified] : invasive ductal cancer grade 2 ( 7/9)  [de-identified] : ER   SD   HER2  0  negative  [de-identified] : Genetic testing Ambry  negative

## 2024-12-09 NOTE — REASON FOR VISIT
[Follow-Up Visit] : a follow-up [Spouse] : spouse [Other: _____] : [unfilled] [FreeTextEntry2] : breast cancer on adjuvant  DD AC-T

## 2024-12-09 NOTE — ASSESSMENT
[FreeTextEntry1] : 59 y/o female with left breast cancer - invasive ductal  mod diff strongly ER and NC positive, HER 2 negative , metastatic to 4/ 17  axillary  LNs with extracapsular extension p T2  pN2a  prognostic stage II A s/p bilateral mastetcomies L ALND and YESICA reconstruction on 10/10/24 ( Dr Pelayo, Dr Benito)   Discussed diagnosis, prognosis, explained relatively high risk of systemic recurrence and rationale, benefits of adjuvant systemic therapy. Discusse side efefcts of chemotherapy, ligistics, schedules, monitoring.  Plan :  Adjuvant chemotherapy DD AC-T followed by adjuvant  radiation, hormonal therapy - AI x 10 yeras and CDK4/6 inhibitor x 2-3 years.  11/19/24  started DD ACT   CC: Dr Homero Smith

## 2024-12-09 NOTE — HISTORY OF PRESENT ILLNESS
[Disease: _____________________] : Disease: [unfilled] [T: ___] : T[unfilled] [N: ___] : N[unfilled] [AJCC Stage: ____] : AJCC Stage: [unfilled] [de-identified] : 59 y/o female with recently diagnoseed let breast cancer. Presented with an abnormal screening mammogram   Presented in July 2024 with a palpable mass in the left breast. Mammogram  and sono 7/31/2024: In the area of palpable concern in the left 4 oclock axis, 5-7 cm from the nipple there are 2 irregular hypoechoic solid masses measuring 1.5 and 1.7 cm with suspicious morphologic features corresponding with the spiculated masses on mammography. Abnormal hypoechoic left axillary node.  8/5/2024 left breast core biopsy (LEFT, 5:00):  - INFILTRATING DUCTAL CARCINOMA, [place] score 3+ 2+ 2 = 7/9   ER/[place]+, Her2 neg. 2. Lymph node, core biopsy (LEFT axillary):  - METASTATIC CARCINOMA.  PET scan 8/20/2024 showed: 1. FDG avid left breast cancer and metastatic left axillary lymphadenopathy. No distant FDG avid disease. 2. Incidental note of FDG avid nodule in the region of the posterior left thyroid. Recommend correlation with ultrasound to evaluate for signs of thyroid malignancy.  thyroid ultrasound 8/28/2024 :  Multiple thyroid nodules consistent with multinodular goiter. A 1.3 cm isoechoic nodule in the mid to lower pole of the left lobe which likely corresponds to the FDG avid lesion seen on the prior PET/CT. Since it is FDG avid, consider further evaluation with ultrasound-guided fine-needle aspiration  10/10/2024 she underwent bilateral  mastectomy (Dr Pelayo) and breast reconstruction with YESICA flaps (Dr Benito).  Pathology revealed: Breast, right, total mastectomy- Benign Lymph node, left axilla, biopsy - One lymph node negative for metastatic carcinoma (0/1). Axillary fat pad, left, dissection  - Four of twelve lymph nodes positive for metastatic carcinoma  (4/12). - The largest metastatic focus measures 22.0 mm in greatest dimension. - Extranodal extension is present (extent 5.0 mm). Breast, left, total mastectomy - Invasive ductal carcinoma, [place] grade 2 (tubule formation: 3, nuclear pleomorphism: 2, mitotic rate: 2; total score 7/9). - The invasive tumor measures 37.0 mm in greatest dimension. - Ductal carcinoma in situ, nuclear grade 2, cribriform, micropapillary and papillary types with necrosis and calcifications.  - The resection margins are negative for carcinoma. The invasive tumor is 2.0 mm from the nearest margin (posterior) and 3.0 mm from the anterior margin. DCIS is 6.0 mm from the nearest margin (posterior). Internal mammary lymph node, right, biopsy:   Internal mammary lymph node, left, biopsy - One lymph node negative for metastatic carcinoma (0/1).   medical Hx : HTN and hyperlipidemia. LIves at home with her . Works as a .   11/19/24  started DD AC   [de-identified] : invasive ductal cancer grade 2 ( 7/9)  [de-identified] : ER   MT   HER2  0  negative  [de-identified] : Genetic testing Ambry  negative

## 2024-12-09 NOTE — ASSESSMENT
[FreeTextEntry1] : 61 y/o female with left breast cancer - invasive ductal  mod diff strongly ER and [place] positive, HER 2 negative , metastatic to 4/ 17  axillary  LNs with extracapsular extension p T2  pN2a  prognostic stage II A s/p bilateral mastetcomies L ALND and YESICA reconstruction on 10/10/24 ( Dr Pelayo, Dr Benito)   Discussed diagnosis, prognosis, explained relatively high risk of systemic recurrence and rationale, benefits of adjuvant systemic therapy. Discusse side efefcts of chemotherapy, ligistics, schedules, monitoring.  Plan :  Adjuvant chemotherapy DD AC-T followed by adjuvant  radiation, hormonal therapy - AI x 10 yeras and CDK4/6 inhibitor x 2-3 years.  11/19/24  started DD ACT   CC: Dr Homero Smith

## 2024-12-09 NOTE — ASSESSMENT
[FreeTextEntry1] : 61 y/o female with left breast cancer - invasive ductal  mod diff strongly ER and [place] positive, HER 2 negative , metastatic to 4/ 17  axillary  LNs with extracapsular extension p T2  pN2a  prognostic stage II A s/p bilateral mastetcomies L ALND and YESICA reconstruction on 10/10/24 ( Dr Pelayo, Dr Benito)   Discussed diagnosis, prognosis, explained relatively high risk of systemic recurrence and rationale, benefits of adjuvant systemic therapy. Discusse side efefcts of chemotherapy, ligistics, schedules, monitoring.  Plan :  Adjuvant chemotherapy DD AC-T followed by adjuvant  radiation, hormonal therapy - AI x 10 yeras and CDK4/6 inhibitor x 2-3 years.  11/19/24  started DD ACT   CC: Dr Homero Smith

## 2024-12-23 NOTE — ASSESSMENT
[FreeTextEntry1] : 61 y/o female with left breast cancer - invasive ductal  mod diff strongly ER and [place] positive, HER 2 negative , metastatic to 4/ 17  axillary  LNs with extracapsular extension p T2  pN2a  prognostic stage II A s/p bilateral mastetcomies L ALND and YESICA reconstruction on 10/10/24 ( Dr Pelayo, Dr Benito)   High risk of systemic recurrence. Plan :  Adjuvant chemotherapy DD AC-T followed by adjuvant  radiation, hormonal therapy - AI x 10 yeras and CDK4/6 inhibitor x 2-3 years.  11/19/24  started DD ACT   Mild LFT's elevation - resolved. Only mild Alk phos- also better. Overall tolerating quite well. Mild nausea manageable. Cytopenia due to chemotx- expected and relatively mild.  AC # 4 will be due next week next start Taxol. Discussed options Taxol  dose dense q 2 weeks x 4  versus weekly x 12 .   patient prefers dose dense schedule as long as tolerable. She will be using cold gloves/ socks during the infusion.  Exam after AC cycle 4   CC: Dr Homero Smith

## 2024-12-23 NOTE — PHYSICAL EXAM
"Occupational Therapy      Patient Name:  Cecile Bowen   MRN:  [id number]    Patient not seen today secondary to Dialysis,Patient unwilling to participate. Patient reported feeling too anxious and was emotional requesting RN to "ask about medicine for my anxiety." RN notified of patient request. Will follow-up next scheduled visit per OT POC.  SUZI Barragan  1/25/2022  " [Normal] : affect appropriate [de-identified] : looks well  [de-identified] : no mucositis    minimal hair  [de-identified] : no lymphedema   mediport site OK  [de-identified] : s/p bilat mastectomies with YESICA reconstruction and L ALND [de-identified] : no lymphedema

## 2024-12-23 NOTE — HISTORY OF PRESENT ILLNESS
[Disease: _____________________] : Disease: [unfilled] [T: ___] : T[unfilled] [N: ___] : N[unfilled] [AJCC Stage: ____] : AJCC Stage: [unfilled] [de-identified] : 59 y/o female with recently diagnoseed let breast cancer. Presented with an abnormal screening mammogram   Presented in July 2024 with a palpable mass in the left breast. Mammogram  and sono 7/31/2024: In the area of palpable concern in the left 4 oclock axis, 5-7 cm from the nipple there are 2 irregular hypoechoic solid masses measuring 1.5 and 1.7 cm with suspicious morphologic features corresponding with the spiculated masses on mammography. Abnormal hypoechoic left axillary node.  8/5/2024 left breast core biopsy (LEFT, 5:00):  - INFILTRATING DUCTAL CARCINOMA, [place] score 3+ 2+ 2 = 7/9   ER/GA+, Her2 neg. 2. Lymph node, core biopsy (LEFT axillary):  - METASTATIC CARCINOMA.  PET scan 8/20/2024 showed: 1. FDG avid left breast cancer and metastatic left axillary lymphadenopathy. No distant FDG avid disease. 2. Incidental note of FDG avid nodule in the region of the posterior left thyroid. Recommend correlation with ultrasound to evaluate for signs of thyroid malignancy.  thyroid ultrasound 8/28/2024 :  Multiple thyroid nodules consistent with multinodular goiter. A 1.3 cm isoechoic nodule in the mid to lower pole of the left lobe which likely corresponds to the FDG avid lesion seen on the prior PET/CT. Since it is FDG avid, consider further evaluation with ultrasound-guided fine-needle aspiration  10/10/2024 she underwent bilateral  mastectomy (Dr Pelayo) and breast reconstruction with YESICA flaps (Dr Benito).  Pathology revealed: Breast, right, total mastectomy- Benign Lymph node, left axilla, biopsy - One lymph node negative for metastatic carcinoma (0/1). Axillary fat pad, left, dissection  - Four of twelve lymph nodes positive for metastatic carcinoma  (4/12). - The largest metastatic focus measures 22.0 mm in greatest dimension. - Extranodal extension is present (extent 5.0 mm). Breast, left, total mastectomy - Invasive ductal carcinoma, [place] grade 2 (tubule formation: 3, nuclear pleomorphism: 2, mitotic rate: 2; total score 7/9). - The invasive tumor measures 37.0 mm in greatest dimension. - Ductal carcinoma in situ, nuclear grade 2, cribriform, micropapillary and papillary types with necrosis and calcifications.  - The resection margins are negative for carcinoma. The invasive tumor is 2.0 mm from the nearest margin (posterior) and 3.0 mm from the anterior margin. DCIS is 6.0 mm from the nearest margin (posterior). Internal mammary lymph node, right, biopsy:   Internal mammary lymph node, left, biopsy - One lymph node negative for metastatic carcinoma (0/1).   medical Hx : HTN and hyperlipidemia. LIves at home with her . Works as a .   11/19/24  started DD AC   [de-identified] : invasive ductal cancer grade 2 ( 7/9)  [de-identified] : ER   UT   HER2  0  negative  [de-identified] : Genetic testing Ambry  negative  [de-identified] :  #  3 on 12/ 16/24.  Nausea on day 1 ( anticipatory)- ativan helped.  Nausea day 4-5- compazine helped No mucositis  fatigued but manageable. No bone pains from  neulasta ( took claritin for few days)

## 2024-12-23 NOTE — HISTORY OF PRESENT ILLNESS
[Disease: _____________________] : Disease: [unfilled] [T: ___] : T[unfilled] [N: ___] : N[unfilled] [AJCC Stage: ____] : AJCC Stage: [unfilled] [de-identified] : 61 y/o female with recently diagnoseed let breast cancer. Presented with an abnormal screening mammogram   Presented in July 2024 with a palpable mass in the left breast. Mammogram  and sono 7/31/2024: In the area of palpable concern in the left 4 oclock axis, 5-7 cm from the nipple there are 2 irregular hypoechoic solid masses measuring 1.5 and 1.7 cm with suspicious morphologic features corresponding with the spiculated masses on mammography. Abnormal hypoechoic left axillary node.  8/5/2024 left breast core biopsy (LEFT, 5:00):  - INFILTRATING DUCTAL CARCINOMA, [place] score 3+ 2+ 2 = 7/9   ER/[place]+, Her2 neg. 2. Lymph node, core biopsy (LEFT axillary):  - METASTATIC CARCINOMA.  PET scan 8/20/2024 showed: 1. FDG avid left breast cancer and metastatic left axillary lymphadenopathy. No distant FDG avid disease. 2. Incidental note of FDG avid nodule in the region of the posterior left thyroid. Recommend correlation with ultrasound to evaluate for signs of thyroid malignancy.  thyroid ultrasound 8/28/2024 :  Multiple thyroid nodules consistent with multinodular goiter. A 1.3 cm isoechoic nodule in the mid to lower pole of the left lobe which likely corresponds to the FDG avid lesion seen on the prior PET/CT. Since it is FDG avid, consider further evaluation with ultrasound-guided fine-needle aspiration  10/10/2024 she underwent bilateral  mastectomy (Dr Pelayo) and breast reconstruction with YESICA flaps (Dr Benito).  Pathology revealed: Breast, right, total mastectomy- Benign Lymph node, left axilla, biopsy - One lymph node negative for metastatic carcinoma (0/1). Axillary fat pad, left, dissection  - Four of twelve lymph nodes positive for metastatic carcinoma  (4/12). - The largest metastatic focus measures 22.0 mm in greatest dimension. - Extranodal extension is present (extent 5.0 mm). Breast, left, total mastectomy - Invasive ductal carcinoma, [place] grade 2 (tubule formation: 3, nuclear pleomorphism: 2, mitotic rate: 2; total score 7/9). - The invasive tumor measures 37.0 mm in greatest dimension. - Ductal carcinoma in situ, nuclear grade 2, cribriform, micropapillary and papillary types with necrosis and calcifications.  - The resection margins are negative for carcinoma. The invasive tumor is 2.0 mm from the nearest margin (posterior) and 3.0 mm from the anterior margin. DCIS is 6.0 mm from the nearest margin (posterior). Internal mammary lymph node, right, biopsy:   Internal mammary lymph node, left, biopsy - One lymph node negative for metastatic carcinoma (0/1).   medical Hx : HTN and hyperlipidemia. LIves at home with her . Works as a .   11/19/24  started DD AC   [de-identified] : invasive ductal cancer grade 2 ( 7/9)  [de-identified] : ER   ID   HER2  0  negative  [de-identified] : Genetic testing Ambry  negative  [de-identified] :  #  3 on 12/ 16/24.  Nausea on day 1 ( anticipatory)- ativan helped.  Nausea day 4-5- compazine helped No mucositis  fatigued but manageable. No bone pains from  neulasta ( took claritin for few days)

## 2024-12-23 NOTE — ASSESSMENT
[FreeTextEntry1] : 59 y/o female with left breast cancer - invasive ductal  mod diff strongly ER and [place] positive, HER 2 negative , metastatic to 4/ 17  axillary  LNs with extracapsular extension p T2  pN2a  prognostic stage II A s/p bilateral mastetcomies L ALND and YESICA reconstruction on 10/10/24 ( Dr Pelayo, Dr Benito)   High risk of systemic recurrence. Plan :  Adjuvant chemotherapy DD AC-T followed by adjuvant  radiation, hormonal therapy - AI x 10 yeras and CDK4/6 inhibitor x 2-3 years.  11/19/24  started DD ACT   Mild LFT's elevation - resolved. Only mild Alk phos- also better. Overall tolerating quite well. Mild nausea manageable. Cytopenia due to chemotx- expected and relatively mild.  AC # 4 will be due next week next start Taxol. Discussed options Taxol  dose dense q 2 weeks x 4  versus weekly x 12 .   patient prefers dose dense schedule as long as tolerable. She will be using cold gloves/ socks during the infusion.  Exam after AC cycle 4   CC: Dr Homreo Smith

## 2024-12-23 NOTE — REVIEW OF SYSTEMS
[Diarrhea: Grade 0] : Diarrhea: Grade 0 [Negative] : Allergic/Immunologic [Fatigue] : fatigue [FreeTextEntry7] : as above

## 2024-12-23 NOTE — REASON FOR VISIT
[Follow-Up Visit] : a follow-up [Spouse] : spouse [FreeTextEntry2] : breast cancer on adjuvant  DD AC-T

## 2024-12-23 NOTE — PHYSICAL EXAM
[Normal] : affect appropriate [de-identified] : no mucositis    minimal hair  [de-identified] : looks well  [de-identified] : no lymphedema   mediport site OK  [de-identified] : s/p bilat mastectomies with YESICA reconstruction and L ALND [de-identified] : no lymphedema

## 2025-01-10 NOTE — HISTORY OF PRESENT ILLNESS
[Disease: _____________________] : Disease: [unfilled] [T: ___] : T[unfilled] [N: ___] : N[unfilled] [AJCC Stage: ____] : AJCC Stage: [unfilled] [Date: ____________] : Patient's last distress assessment performed on [unfilled]. [1 - Distress Level] : Distress Level: 1 [de-identified] :  GRACE GRAY is a 60 y.o. F with no significant PMH, who we are following for an ER+, HER2-, left sided T2, N2a, prognostic stage IIA ductal breast cancer.   Prior mammo 2022 reportedly showed a finding in the left breast (Preet). Mother had breast cancer at age 32. No genetic testing was performed. 7/31/24 - Mammo/Sono - 2 spiculated masses in the left LOQ - 1.5 and 1.8cm, corresponding with irregular hypoechoic masses in sono. Masses are ~2cm apart with a complex cystic mass and heterogeneous calcifications between them. The abnormal findings span a distance of ~ 4.5cm.  Abnormal hypoechoic left axillary LN.  8/5/24 - Left breast core biopsy 5:00 and left axillary LN - infiltrating ductal carcinoma (7/9), ER > 95%, [place] > 95%, HER2 neg; LEFT axillary LN - metastatic carcinoma.  8/20/24 - PET/CT - FDG avid left breast cancer and metastatic left axillary lymphadenopathy. No distant FDG avid disease. Incidental note of FDG avid nodule in the region of the posterior left thyroid. 8/28/24 - Thyroid US - Multiple thyroid nodules consistent with multinodular goiter. A 1.3 cm isoechoic nodule in the mid to lower pole of the left lobe which likely corresponds to the FDG avid lesion seen on the prior PET/CT.    10/10/24 - Bilateral mastectomy (Dr. Pelayo) and breast reconstruction with YESICA flaps (Dr. Benito). PATH - Right breast - Benign.  Left breast - 3.7cm Invasive ductal carcinoma (7/9), grade 2, with ductal carcinoma in situ, nuclear grade 2, cribriform, micropapillary and papillary types with necrosis and calcifications. Margins are negative. The invasive tumor is 2.0 mm from the nearest margin (posterior) and 3.0 mm from the anterior margin.   4/14 LN's positive, largest metastatic focus 2.2 cm in greatest dimension. + Extranodal extension (extent 5.0 mm).  Plan: Adjuvant chemotherapy DDAC -> T followed by adjuvant radiation, hormonal therapy with AI x 10 years and CDK4/6 inhibitor x 2-3 years. 11/19/21 - Started DDAC [de-identified] : invasive ductal cancer, grade 2 (7/9)  [de-identified] : ER > 95%, CO > 95%, HER2 neg [de-identified] : 8/6/24 - [facility] 71 gene panel - negative (VUS KIF1B - associated with pheochromocytoma and paragangliomas) [de-identified] : Patient D11 C4 Adjuvant DDAC Patient reports this last round went the "same" but she is having more fatigue. Only got energy back yesterday.  Continues to take Omeprazole daily. Using Zahra caps.   Denies any changes in her family, medical, or social history since her last visit of 12/23/24.

## 2025-01-10 NOTE — REVIEW OF SYSTEMS
[Patient Intake Form Reviewed] : Patient intake form was reviewed [Fatigue] : fatigue [Joint Pain] : joint pain [Negative] : Allergic/Immunologic [de-identified] : headaches [FreeTextEntry7] : loss of appetite, nausea, constipation and diarrhea

## 2025-01-10 NOTE — PHYSICAL EXAM
[Obese] : obese [Normal] : affect appropriate [de-identified] : hair with significant thinning [de-identified] : anicteric [de-identified] : no c/c/e, right port without s/s infection [de-identified] : no lymphedema  [de-identified] : no rashes

## 2025-01-10 NOTE — HISTORY OF PRESENT ILLNESS
[Disease: _____________________] : Disease: [unfilled] [T: ___] : T[unfilled] [N: ___] : N[unfilled] [AJCC Stage: ____] : AJCC Stage: [unfilled] [Date: ____________] : Patient's last distress assessment performed on [unfilled]. [1 - Distress Level] : Distress Level: 1 [de-identified] :  GRACE GRAY is a 60 y.o. F with no significant PMH, who we are following for an ER+, HER2-, left sided T2, N2a, prognostic stage IIA ductal breast cancer.   Prior mammo 2022 reportedly showed a finding in the left breast (Preet). Mother had breast cancer at age 32. No genetic testing was performed. 7/31/24 - Mammo/Sono - 2 spiculated masses in the left LOQ - 1.5 and 1.8cm, corresponding with irregular hypoechoic masses in sono. Masses are ~2cm apart with a complex cystic mass and heterogeneous calcifications between them. The abnormal findings span a distance of ~ 4.5cm.  Abnormal hypoechoic left axillary LN.  8/5/24 - Left breast core biopsy 5:00 and left axillary LN - infiltrating ductal carcinoma (7/9), ER > 95%, [place] > 95%, HER2 neg; LEFT axillary LN - metastatic carcinoma.  8/20/24 - PET/CT - FDG avid left breast cancer and metastatic left axillary lymphadenopathy. No distant FDG avid disease. Incidental note of FDG avid nodule in the region of the posterior left thyroid. 8/28/24 - Thyroid US - Multiple thyroid nodules consistent with multinodular goiter. A 1.3 cm isoechoic nodule in the mid to lower pole of the left lobe which likely corresponds to the FDG avid lesion seen on the prior PET/CT.    10/10/24 - Bilateral mastectomy (Dr. Pelayo) and breast reconstruction with YESICA flaps (Dr. Benito). PATH - Right breast - Benign.  Left breast - 3.7cm Invasive ductal carcinoma (7/9), grade 2, with ductal carcinoma in situ, nuclear grade 2, cribriform, micropapillary and papillary types with necrosis and calcifications. Margins are negative. The invasive tumor is 2.0 mm from the nearest margin (posterior) and 3.0 mm from the anterior margin.   4/14 LN's positive, largest metastatic focus 2.2 cm in greatest dimension. + Extranodal extension (extent 5.0 mm).  Plan: Adjuvant chemotherapy DDAC -> T followed by adjuvant radiation, hormonal therapy with AI x 10 years and CDK4/6 inhibitor x 2-3 years. 11/19/21 - Started DDAC [de-identified] : invasive ductal cancer, grade 2 (7/9)  [de-identified] : ER > 95%, [place] > 95%, HER2 neg [de-identified] : 8/6/24 - [facility] 71 gene panel - negative (VUS KIF1B - associated with pheochromocytoma and paragangliomas) [de-identified] : Patient D11 C4 Adjuvant DDAC Patient reports this last round went the "same" but she is having more fatigue. Only got energy back yesterday.  Continues to take Omeprazole daily. Using Zahra caps.   Denies any changes in her family, medical, or social history since her last visit of 12/23/24.

## 2025-01-10 NOTE — REASON FOR VISIT
[Follow-Up Visit] : a follow-up [Other: _____] : [unfilled] [Spouse] : spouse [FreeTextEntry2] : Breast cancer - D11 C4 Adjuvant DDAC

## 2025-01-10 NOTE — PHYSICAL EXAM
[Obese] : obese [Normal] : affect appropriate [de-identified] : hair with significant thinning [de-identified] : anicteric [de-identified] : no c/c/e, right port without s/s infection [de-identified] : no lymphedema  [de-identified] : no rashes

## 2025-01-10 NOTE — ASSESSMENT
[FreeTextEntry1] : Patient is a 60 y.o. with an ER+, HER2-, left sided T2, N2a (+4/17 LN's with SIXTO), prognostic stage IIA ductal breast cancer, s/p bilateral mastectomies L ALND and YESICA reconstruction on 10/10/24. Plan: Adjuvant chemotherapy DD AC ->T followed by adjuvant RT, hormonal therapy with AI x 10 years and CDK4/6 inhibitor x 2-3 years. 11/19/21 - Started DDAC. Today D11 C4 Adjuvant DDAC. Patient overall tolerated as expected. Has had some delayed nausea and chemo-induced fatigue.    Plan: 1. Onc - Due to start on DD Taxol 1/13/25. Briefly reviewed this. Aware may have either Neulasta bone pains or TIPS. Discussed how to manage. She does plan to ice her hands and feet as well to help prevent peripheral neuropathies.  2. Hair loss - Plans to continue using DignaCaps. 3. Fatigue - Discussed she likely has anemia today, and she does have a relative anemia with baseline Hgb in 14's, now in 11's.  She will feel this 3gm/dl drop.   PE 2/3/25.  Dr. Homero Smith (PCP) Dr. Leno Pelayo (Breast surgery) Dr. Bob Benito (Plastics) Dr. Natasha Leo (Rad/Onc)

## 2025-01-10 NOTE — REVIEW OF SYSTEMS
[Patient Intake Form Reviewed] : Patient intake form was reviewed [Fatigue] : fatigue [Joint Pain] : joint pain [Negative] : Allergic/Immunologic [de-identified] : headaches [FreeTextEntry7] : loss of appetite, nausea, constipation and diarrhea

## 2025-01-21 NOTE — REVIEW OF SYSTEMS
[Patient Intake Form Reviewed] : Patient intake form was reviewed [Fatigue] : fatigue [Abdominal Pain] : abdominal pain [Negative] : Allergic/Immunologic [FreeTextEntry7] : loss of appetite, constipation  [FreeTextEntry9] : bone pains as in interval hx

## 2025-01-21 NOTE — HISTORY OF PRESENT ILLNESS
[Disease: _____________________] : Disease: [unfilled] [T: ___] : T[unfilled] [N: ___] : N[unfilled] [AJCC Stage: ____] : AJCC Stage: [unfilled] [de-identified] :  GRACE GRAY is a 60 y.o. F with no significant PMH, who we are following for an ER+, HER2-, left sided T2, N2a, prognostic stage IIA ductal breast cancer.   Prior mammo 2022 reportedly showed a finding in the left breast (Preet). Mother had breast cancer at age 32. No genetic testing was performed. 7/31/24 - Mammo/Sono - 2 spiculated masses in the left LOQ - 1.5 and 1.8cm, corresponding with irregular hypoechoic masses in sono. Masses are ~2cm apart with a complex cystic mass and heterogeneous calcifications between them. The abnormal findings span a distance of ~ 4.5cm.  Abnormal hypoechoic left axillary LN.  8/5/24 - Left breast core biopsy 5:00 and left axillary LN - infiltrating ductal carcinoma (7/9), ER > 95%, [place] > 95%, HER2 neg; LEFT axillary LN - metastatic carcinoma.  8/20/24 - PET/CT - FDG avid left breast cancer and metastatic left axillary lymphadenopathy. No distant FDG avid disease. Incidental note of FDG avid nodule in the region of the posterior left thyroid. 8/28/24 - Thyroid US - Multiple thyroid nodules consistent with multinodular goiter. A 1.3 cm isoechoic nodule in the mid to lower pole of the left lobe which likely corresponds to the FDG avid lesion seen on the prior PET/CT.    10/10/24 - Bilateral mastectomy (Dr. Pelayo) and breast reconstruction with YESICA flaps (Dr. Benito). PATH - Right breast - Benign.  Left breast - 3.7cm Invasive ductal carcinoma (7/9), grade 2, with ductal carcinoma in situ, nuclear grade 2, cribriform, micropapillary and papillary types with necrosis and calcifications. Margins are negative. The invasive tumor is 2.0 mm from the nearest margin (posterior) and 3.0 mm from the anterior margin.   4/14 LN's positive, largest metastatic focus 2.2 cm in greatest dimension. + Extranodal extension (extent 5.0 mm).  Plan: Adjuvant chemotherapy DDAC -> T followed by adjuvant radiation, hormonal therapy with AI x 10 years and CDK4/6 inhibitor x 2-3 years. 11/19/21 - Started DD AC 01/13/24 - Started DD Taxol [de-identified] : invasive ductal cancer, grade 2 (7/9)  [de-identified] : ER > 95%, SC > 95%, HER2 neg [de-identified] : 8/6/24 - [facility] 71 gene panel - negative (VUS KIF1B - associated with pheochromocytoma and paragangliomas) [de-identified] : Patient D9 C1 Adjuvant DD Taxol (as part of DDAC->T) Patient reports major issue was bone pains - started nite of D2 and lasted ~ 24 hours.  Did not take anything for this. Had some mild fatigue as well.  Then did OK.  Did not have any N/V but has some mild stomach pains.  Also notices she is having early satiety.  Has constipation after chemo - using MOM.  Using Zahra caps.   Denies any other changes in her family, medical, or social history since her last visit of 12/23/24.

## 2025-01-21 NOTE — PHYSICAL EXAM
[Obese] : obese [Normal] : affect appropriate [de-identified] : hair with significant thinning [de-identified] : anicteric [de-identified] : no c/c/e, right port without s/s infection [de-identified] : no lymphedema  [de-identified] : no rashes

## 2025-01-21 NOTE — REASON FOR VISIT
[Follow-Up Visit] : a follow-up [Spouse] : spouse [FreeTextEntry2] : Breast cancer - D9 C1 Adjuvant DD Taxol (as part of DDAC->T)

## 2025-01-21 NOTE — ASSESSMENT
[FreeTextEntry1] : Patient is a 60 y.o. with an ER+, HER2-, left sided T2, N2a (+4/17 LN's with SIXTO), prognostic stage IIA ductal breast cancer, s/p bilateral mastectomies L ALND and YESICA reconstruction on 10/10/24. Plan: Adjuvant chemotherapy DD AC ->T followed by adjuvant RT, hormonal therapy with AI x 10 years and CDK4/6 inhibitor x 2-3 years. 11/19/21 - Started DDAC. 01/13/24 - Started DD Taxol. Patient D9 C1 Adjuvant DD Taxol (as part of DDAC->T) Patient reports major issue was bone pains, else tolerated well.    Plan: 1. Bone pains - Will change from Neulasta OnPro to Neulasta 3mg D2.  2. Hair loss - Plans to continue using DignaCaps. 3. Onc- C#2 due on 1/27/25.  PE 2/3/25.  Dr. Homero Smith (PCP) Dr. Leno Pelayo (Breast surgery) Dr. Bob Benito (Plastics) Dr. Natasha Leo (Rad/Onc)
MD Bender
round/brisk

## 2025-01-23 NOTE — ASSESSMENT
[FreeTextEntry1] : Patient is doing well and healing as expected Patient to finish chemotherapy sessions and radiation Discussed scar massages twice daily for patient to perform on their own using their preferred lotion or scar cream. Massage all incisions twice daily using firm pressure in a circular motion with lotion or in the shower. Perform massage for 5 minutes in the morning and 5 minutes in the evening for at least 6-8 weeks. Advised patient that if they will be in direct sunlight to use SPF 30 or higher over all of the healing incisions to prevent color changes. monitor for redness, swelling, fever, chills Patient without restrictions at this time and may proceed with their normal daily activities Patient may wear whatever bra they choose  she is encouraged to call if there are any changes RTO once finished with treatments to discuss revision  all pt questions answered

## 2025-01-23 NOTE — HISTORY OF PRESENT ILLNESS
[FreeTextEntry1] : Ms. GRACE GRAY  is a 60 year  old female  with past medical history of a thyroid mass, HTN and high cholesterol who presents with newly diagnosed left breast cancer.  Pt was diagnosed on a screening mammogram August 2024, and later confirmed with ultrasound guided core biopsy 8/5/24. pt was referred to the office by Dr Pelayo to discuss her reconstructive options.   She is now s/p bilateral breast reconstruction with YESICA flaps 10/10/24 Doing well She has been undergoing chemotherapy

## 2025-01-23 NOTE — PHYSICAL EXAM
[NI] : Normal [de-identified] : bilateral breasts soft  left breast slightly larger than right no signs of infection or palpable fluid collections noted nipples viable incisions c/d/i breast skin flaps with normal capillary refill and warm [de-identified] : incisions c/d/i abdomen soft and nontender umbilicus viable no palpable fluid collections or signs of infection noted skin with normal capillary refill, skin warm to the touch

## 2025-01-23 NOTE — PHYSICAL EXAM
[NI] : Normal [de-identified] : bilateral breasts soft  left breast slightly larger than right no signs of infection or palpable fluid collections noted nipples viable incisions c/d/i breast skin flaps with normal capillary refill and warm [de-identified] : incisions c/d/i abdomen soft and nontender umbilicus viable no palpable fluid collections or signs of infection noted skin with normal capillary refill, skin warm to the touch

## 2025-01-23 NOTE — REASON FOR VISIT
[Follow-Up: _____] : a [unfilled] follow-up visit [FreeTextEntry1] : s/p breast reconstruction with YESICA flaps

## 2025-02-05 NOTE — REVIEW OF SYSTEMS
[Patient Intake Form Reviewed] : Patient intake form was reviewed [Fatigue] : fatigue [Abdominal Pain] : abdominal pain [Negative] : Allergic/Immunologic [Cough] : cough [FreeTextEntry4] : mild sore throat [FreeTextEntry6] : congestion [FreeTextEntry7] : loss of appetite, constipation  [FreeTextEntry9] : bone pains as in interval hx

## 2025-02-05 NOTE — HISTORY OF PRESENT ILLNESS
[Disease: _____________________] : Disease: [unfilled] [T: ___] : T[unfilled] [N: ___] : N[unfilled] [AJCC Stage: ____] : AJCC Stage: [unfilled] [de-identified] :  GRACE GRAY is a 60 y.o. F with no significant PMH, who we are following for an ER+, HER2-, left sided T2, N2a, prognostic stage IIA ductal breast cancer.   Prior mammo 2022 reportedly showed a finding in the left breast (Preet). Mother had breast cancer at age 32. No genetic testing was performed. 7/31/24 - Mammo/Sono - 2 spiculated masses in the left LOQ - 1.5 and 1.8cm, corresponding with irregular hypoechoic masses in sono. Masses are ~2cm apart with a complex cystic mass and heterogeneous calcifications between them. The abnormal findings span a distance of ~ 4.5cm.  Abnormal hypoechoic left axillary LN.  8/5/24 - Left breast core biopsy 5:00 and left axillary LN - infiltrating ductal carcinoma (7/9), ER > 95%, AL > 95%, HER2 neg; LEFT axillary LN - metastatic carcinoma.  8/20/24 - PET/CT - FDG avid left breast cancer and metastatic left axillary lymphadenopathy. No distant FDG avid disease. Incidental note of FDG avid nodule in the region of the posterior left thyroid. 8/28/24 - Thyroid US - Multiple thyroid nodules consistent with multinodular goiter. A 1.3 cm isoechoic nodule in the mid to lower pole of the left lobe which likely corresponds to the FDG avid lesion seen on the prior PET/CT.    10/10/24 - Bilateral mastectomy (Dr. Pelayo) and breast reconstruction with YESICA flaps (Dr. Benito). PATH - Right breast - Benign.  Left breast - 3.7cm Invasive ductal carcinoma (7/9), grade 2, with ductal carcinoma in situ, nuclear grade 2, cribriform, micropapillary and papillary types with necrosis and calcifications. Margins are negative. The invasive tumor is 2.0 mm from the nearest margin (posterior) and 3.0 mm from the anterior margin.   4/14 LN's positive, largest metastatic focus 2.2 cm in greatest dimension. + Extranodal extension (extent 5.0 mm).  Plan: Adjuvant chemotherapy DDAC -> T followed by adjuvant radiation, hormonal therapy with AI x 10 years and CDK4/6 inhibitor x 2-3 years. 11/19/21 - Started DD AC 01/13/24 - Started DD Taxol [de-identified] : invasive ductal cancer, grade 2 (7/9)  [de-identified] : ER > 95%, AK > 95%, HER2 neg [de-identified] : 8/6/24 - [facility] 71 gene panel - negative (VUS KIF1B - associated with pheochromocytoma and paragangliomas) [de-identified] : Patient D10 C2 Adjuvant DD Taxol (as part of DDAC->T) After 1st cycle Neulasta was changed to 3mg D2 due to bone pains.  Patient reports that this helped a lot.  She states she did well this cycle until the weekend, then developed congestion, hard to cough, and throat bothering her.  Low grade temp to 99's.  Taking only Tylenol now.  Has O2 meter at home -  sat 97%.  Using Zahra caps.   Denies any other changes in her family, medical, or social history since her last visit of 1/21/25.

## 2025-02-05 NOTE — REASON FOR VISIT
[Follow-Up Visit] : a follow-up [Spouse] : spouse [Home] : at home, [unfilled] , at the time of the visit. [Medical Office: ([facility])___] : at the medical office located in  [Patient] : the patient [Self] : self [FreeTextEntry2] : Breast cancer - D10 C2 Adjuvant DD Taxol (as part of DDAC->T)

## 2025-02-05 NOTE — ASSESSMENT
[FreeTextEntry1] : Patient is a 60 y.o. with an ER+, HER2-, left sided T2, N2a (+4/17 LN's with SIXTO), prognostic stage IIA ductal breast cancer, s/p bilateral mastectomies L ALND and YESICA reconstruction on 10/10/24. Plan: Adjuvant chemotherapy DD AC ->T followed by adjuvant RT, hormonal therapy with AI x 10 years and CDK4/6 inhibitor x 2-3 years. 11/19/21 - Started DDAC. 01/13/24 - Started DD Taxol. Patient D10 C2 Adjuvant DD Taxol (as part of DDAC->T) Patient reports major issue was bone pains, else tolerated well.  Now with apparent URI.    Plan: 1. Bone pains - Improved with Neulasta 3mg D2.  2. Hair loss - Plans to continue using DignaCaps. 3. URI - Patient informed likely viral and that she can use OTC meds.  Given Rx for Z-pac. Aware to call us should things get worse.  4. Onc- C#3 due on 2/10/25.  PE 2/19/25.  Dr. Homero Smith (PCP) Dr. Leno Pelayo (Breast surgery) Dr. Bob Benito (Plastics) Dr. Natasha Leo (Rad/Onc)

## 2025-02-26 NOTE — ASSESSMENT
[FreeTextEntry1] : 61 y/o female with left breast cancer - invasive ductal  mod diff strongly ER and TN positive, HER 2 negative , metastatic to 4/ 17  axillary  LNs with extracapsular extension p T2  pN2a  prognostic stage II A s/p bilateral mastectomies L ALND and YESICA reconstruction on 10/10/24 ( Dr Pelayo, Dr Benito)   High risk of systemic recurrence. Plan :  Adjuvant chemotherapy DD AC-T followed by adjuvant  radiation, hormonal therapy - AI x 10 yeras and CDK4/6 inhibitor x 2-3 years.  11/19/24  started DD ACT   Her last chemotherapy Taxol 4/4 is due 3/6/25.  She will follow up with Dr Leo to start RT  after chemo completed.   Discussed  adjuvant hormonal therapy- discussed potential side effects of AI. Can start AI after chemo- or start after RT completed ( will check with Rad Onc). CDK4/6 inhibitor- will discuss after RT completed.  She will need baseline bone density later this year ( not urgent).  Answered multiple questions re diet/ supplements.   Discussed port removal- she is considering having  port removed either before and right after RT completed.   Exam after cycle 4 of Taxol.

## 2025-02-26 NOTE — HISTORY OF PRESENT ILLNESS
[Disease: _____________________] : Disease: [unfilled] [T: ___] : T[unfilled] [N: ___] : N[unfilled] [AJCC Stage: ____] : AJCC Stage: [unfilled] [de-identified] : 59 y/o female with recently diagnoseed let breast cancer. Presented with an abnormal screening mammogram   Presented in July 2024 with a palpable mass in the left breast. Mammogram  and sono 7/31/2024: In the area of palpable concern in the left 4 oclock axis, 5-7 cm from the nipple there are 2 irregular hypoechoic solid masses measuring 1.5 and 1.7 cm with suspicious morphologic features corresponding with the spiculated masses on mammography. Abnormal hypoechoic left axillary node.  8/5/2024 left breast core biopsy (LEFT, 5:00):  - INFILTRATING DUCTAL CARCINOMA, Bolton score 3+ 2+ 2 = 7/9   ER/IN+, Her2 neg. 2. Lymph node, core biopsy (LEFT axillary):  - METASTATIC CARCINOMA.  PET scan 8/20/2024 showed: 1. FDG avid left breast cancer and metastatic left axillary lymphadenopathy. No distant FDG avid disease. 2. Incidental note of FDG avid nodule in the region of the posterior left thyroid. Recommend correlation with ultrasound to evaluate for signs of thyroid malignancy.  thyroid ultrasound 8/28/2024 :  Multiple thyroid nodules consistent with multinodular goiter. A 1.3 cm isoechoic nodule in the mid to lower pole of the left lobe which likely corresponds to the FDG avid lesion seen on the prior PET/CT. Since it is FDG avid, consider further evaluation with ultrasound-guided fine-needle aspiration  10/10/2024 she underwent bilateral  mastectomy (Dr Pelayo) and breast reconstruction with YESICA flaps (Dr Benito).  Pathology revealed: Breast, right, total mastectomy- Benign Lymph node, left axilla, biopsy - One lymph node negative for metastatic carcinoma (0/1). Axillary fat pad, left, dissection  - Four of twelve lymph nodes positive for metastatic carcinoma  (4/12). - The largest metastatic focus measures 22.0 mm in greatest dimension. - Extranodal extension is present (extent 5.0 mm). Breast, left, total mastectomy - Invasive ductal carcinoma, Bolton grade 2 (tubule formation: 3, nuclear pleomorphism: 2, mitotic rate: 2; total score 7/9). - The invasive tumor measures 37.0 mm in greatest dimension. - Ductal carcinoma in situ, nuclear grade 2, cribriform, micropapillary and papillary types with necrosis and calcifications.  - The resection margins are negative for carcinoma. The invasive tumor is 2.0 mm from the nearest margin (posterior) and 3.0 mm from the anterior margin. DCIS is 6.0 mm from the nearest margin (posterior). Internal mammary lymph node, right, biopsy:   Internal mammary lymph node, left, biopsy - One lymph node negative for metastatic carcinoma (0/1).   medical Hx : HTN and hyperlipidemia. LIves at home with her . Works as a .   11/19/24  started DD AC  2/20/25  Taxol # 3/4   [de-identified] : invasive ductal cancer grade 2 ( 7/9)  [de-identified] : ER   MI   HER2  0  negative  [de-identified] : Genetic testing Ambry  negative  [de-identified] : Tolerated last chemo quite OK. Less bone pains since Neulast dose decreased to 3 mg. No mucositis. no neuropathy. Fatigued a bit more.

## 2025-02-26 NOTE — HISTORY OF PRESENT ILLNESS
[Disease: _____________________] : Disease: [unfilled] [T: ___] : T[unfilled] [N: ___] : N[unfilled] [AJCC Stage: ____] : AJCC Stage: [unfilled] [de-identified] : 61 y/o female with recently diagnoseed let breast cancer. Presented with an abnormal screening mammogram   Presented in July 2024 with a palpable mass in the left breast. Mammogram  and sono 7/31/2024: In the area of palpable concern in the left 4 oclock axis, 5-7 cm from the nipple there are 2 irregular hypoechoic solid masses measuring 1.5 and 1.7 cm with suspicious morphologic features corresponding with the spiculated masses on mammography. Abnormal hypoechoic left axillary node.  8/5/2024 left breast core biopsy (LEFT, 5:00):  - INFILTRATING DUCTAL CARCINOMA, Laingsburg score 3+ 2+ 2 = 7/9   ER/SC+, Her2 neg. 2. Lymph node, core biopsy (LEFT axillary):  - METASTATIC CARCINOMA.  PET scan 8/20/2024 showed: 1. FDG avid left breast cancer and metastatic left axillary lymphadenopathy. No distant FDG avid disease. 2. Incidental note of FDG avid nodule in the region of the posterior left thyroid. Recommend correlation with ultrasound to evaluate for signs of thyroid malignancy.  thyroid ultrasound 8/28/2024 :  Multiple thyroid nodules consistent with multinodular goiter. A 1.3 cm isoechoic nodule in the mid to lower pole of the left lobe which likely corresponds to the FDG avid lesion seen on the prior PET/CT. Since it is FDG avid, consider further evaluation with ultrasound-guided fine-needle aspiration  10/10/2024 she underwent bilateral  mastectomy (Dr Pelayo) and breast reconstruction with YESICA flaps (Dr Benito).  Pathology revealed: Breast, right, total mastectomy- Benign Lymph node, left axilla, biopsy - One lymph node negative for metastatic carcinoma (0/1). Axillary fat pad, left, dissection  - Four of twelve lymph nodes positive for metastatic carcinoma  (4/12). - The largest metastatic focus measures 22.0 mm in greatest dimension. - Extranodal extension is present (extent 5.0 mm). Breast, left, total mastectomy - Invasive ductal carcinoma, Laingsburg grade 2 (tubule formation: 3, nuclear pleomorphism: 2, mitotic rate: 2; total score 7/9). - The invasive tumor measures 37.0 mm in greatest dimension. - Ductal carcinoma in situ, nuclear grade 2, cribriform, micropapillary and papillary types with necrosis and calcifications.  - The resection margins are negative for carcinoma. The invasive tumor is 2.0 mm from the nearest margin (posterior) and 3.0 mm from the anterior margin. DCIS is 6.0 mm from the nearest margin (posterior). Internal mammary lymph node, right, biopsy:   Internal mammary lymph node, left, biopsy - One lymph node negative for metastatic carcinoma (0/1).   medical Hx : HTN and hyperlipidemia. LIves at home with her . Works as a .   11/19/24  started DD AC  2/20/25  Taxol # 3/4   [de-identified] : invasive ductal cancer grade 2 ( 7/9)  [de-identified] : ER   SC   HER2  0  negative  [de-identified] : Genetic testing Ambry  negative  [de-identified] : Tolerated last chemo quite OK. Less bone pains since Neulast dose decreased to 3 mg. No mucositis. no neuropathy. Fatigued a bit more.

## 2025-02-26 NOTE — PHYSICAL EXAM
[Normal] : affect appropriate [de-identified] : looks well  [de-identified] : no mucositis   mild hair loss   [de-identified] : no lymphedema   mediport site OK  [de-identified] : s/p bilat mastectomies with YESICA reconstruction and L ALND [de-identified] : no lymphedema

## 2025-02-26 NOTE — ASSESSMENT
[FreeTextEntry1] : 61 y/o female with left breast cancer - invasive ductal  mod diff strongly ER and ID positive, HER 2 negative , metastatic to 4/ 17  axillary  LNs with extracapsular extension p T2  pN2a  prognostic stage II A s/p bilateral mastectomies L ALND and YESICA reconstruction on 10/10/24 ( Dr Pelayo, Dr Benito)   High risk of systemic recurrence. Plan :  Adjuvant chemotherapy DD AC-T followed by adjuvant  radiation, hormonal therapy - AI x 10 yeras and CDK4/6 inhibitor x 2-3 years.  11/19/24  started DD ACT   Her last chemotherapy Taxol 4/4 is due 3/6/25.  She will follow up with Dr Leo to start RT  after chemo completed.   Discussed  adjuvant hormonal therapy- discussed potential side effects of AI. Can start AI after chemo- or start after RT completed ( will check with Rad Onc). CDK4/6 inhibitor- will discuss after RT completed.  She will need baseline bone density later this year ( not urgent).  Answered multiple questions re diet/ supplements.   Discussed port removal- she is considering having  port removed either before and right after RT completed.   Exam after cycle 4 of Taxol.

## 2025-02-26 NOTE — PHYSICAL EXAM
[Normal] : affect appropriate [de-identified] : looks well  [de-identified] : no mucositis   mild hair loss   [de-identified] : no lymphedema   mediport site OK  [de-identified] : s/p bilat mastectomies with YESICA reconstruction and L ALND [de-identified] : no lymphedema

## 2025-02-26 NOTE — REVIEW OF SYSTEMS
[Fatigue] : fatigue [Diarrhea: Grade 0] : Diarrhea: Grade 0 [FreeTextEntry7] : as above  [Negative] : Gastrointestinal

## 2025-03-13 NOTE — HISTORY OF PRESENT ILLNESS
[FreeTextEntry1] : Ms. Elizalde presents today for consideration for radiation therapy for breast cancer, referred by Dr. Pelayo.  Bilateral breast ultrasound done on 7/31/2024 showed: In the area of palpable concern in the left 4 oclock axis, 5-7 cm from the nipple there are 2 irregular hypoechoic solid masses measuring 1.5 and 1.7 cm with suspicious morphologic features corresponding with the spiculated masses on mammography. Abnormal hypoechoic left axillary node.   8/5/2024 left breast biopsy showed: 1.  Breast, core biopsy (LEFT, 5:00):      -   INFILTRATING DUCTAL CARCINOMA, Disha score 3+ 2+ 2 = 7/9         ER/MN+, Her2 neg. 2.  Lymph node, core biopsy (LEFT axillary):      -   METASTATIC CARCINOMA.  PET scan 8/20/2024 showed: 1. FDG avid left breast cancer and metastatic left axillary lymphadenopathy. No distant FDG avid disease. 2. Incidental note of FDG avid nodule in the region of the posterior left thyroid. Recommend correlation with ultrasound to evaluate for signs of thyroid malignancy.  thyroid ultrasound 8/28/2024 showed: Multiple thyroid nodules consistent with multinodular goiter. A 1.3 cm isoechoic nodule in the mid to lower pole of the left lobe which likely corresponds to the FDG avid lesion seen on the prior PET/CT. Since it is FDG avid, consider further evaluation with ultrasound-guided fine-needle aspiration  10/10/2024 she underwent bilateral total mastectomy (Dr Pelayo) and breast reconstruction with YESICA flaps (Dr Benito). Pathology revealed: 1.  Breast, right, total mastectomy-  Benign 2.  Lymph node, left axilla, biopsy -   One lymph node negative for metastatic carcinoma (0/1). 3.  Axillary fat pad, left, dissection -   Four of twelve lymph nodes positive for metastatic carcinoma (4/12). -   The largest metastatic focus measures 22.0 mm in greatest dimension. -   Extranodal extension is present (extent 5.0 mm). 4.  Breast, left, posterior margin, biopsy -   Benign fibroadipose tissue. 5.  Breast, left, total mastectomy -   Invasive ductal carcinoma, Disha grade 2 (tubule formation: 3, nuclear pleomorphism: 2, mitotic rate: 2; total score 7/9). -   The invasive tumor measures 37.0 mm in greatest dimension. -   Ductal carcinoma in situ, nuclear grade 2, cribriform, micropapillary and papillary types with necrosis and calcifications.  The DCIS spans a distance of approximately 40.0 mm in greatest dimension and is present in 4 of 9 slides. -   No lymphovascular invasion identified. -   The resection margins are negative for carcinoma.  The invasive tumor is 2.0 mm from the nearest margin (posterior) and 3.0 mm from the anterior margin.  DCIS is 6.0 mm from the nearest margin (posterior). -   Nipple and skin negative for carcinoma. -   Complex sclerosing lesion, focally involved by DCIS. -   Intraductal papillomata, focally involved by DCIS. 6.  Internal mammary lymph node, right, biopsy -   Three lymph nodes negative for metastatic carcinoma (0/3). 7.  Internal mammary lymph node, left, biopsy -   One lymph node negative for metastatic carcinoma (0/1).  Genetic testing Ambry negative  Dr. Cueva prescribed dose-dense AC followed by Taxol chemotherapy, initiated on November 19, 2024, and completed on March 6, 2025.   She tolerated well, but has some neuropathy and alopecia.  Dr. Cueva also recommends a 10-year course of an aromatase inhibitor. The addition of a CDK 4/6 inhibitor for 2-3 years will be considered after radiation therapy.  In Summary: This patient is a 60-year-old postmenopausal female with stage IIA ([id number]), moderately differentiated, invasive ductal carcinoma of the left breast, involving the lower outer quadrant. She is ER/MN positive and Her2 negative. She underwent left mastectomy, axillary lymph node dissection (ALND), and YESICA flap breast reconstruction. She has completed postop adjuvant chemtox dose-dense AC and T.  Four positive left axillary lymph nodes with extranodal extension (DEE) place her at risk for locoregional recurrence, therefore adjuvant radiation therapy is recommended.  3/13/25: Patient presents for followup.  She tolerated chemotherapy well, denying fatigue, pain, swelling, numbness/tingling, signs/symptoms of infection, nausea, or vomiting. She continues to follow with Dr. Pelayo (next appointment 04/21/25) and Dr. Cueva (next appointment 03/24/25). She is considering cosmetic revision with Dr. Benito after completing radiation therapy.

## 2025-03-13 NOTE — DISEASE MANAGEMENT
[Pathological] : TNM Stage: p [IIA] : IIA [FreeTextEntry4] : LEFT breast cancer, ER/WV+, Her2 neg [TTNM] : 2 [NTNM] : 2a [MTNM] : 0

## 2025-03-13 NOTE — PHYSICAL EXAM
[No UE Edema] : there is no upper extremity edema [Normal] : oriented to person, place and time, the affect was normal, the mood was normal and not anxious [de-identified] : bilateral mastectomy/YESICA flap reconstruction

## 2025-03-13 NOTE — PHYSICAL EXAM
[No UE Edema] : there is no upper extremity edema [Normal] : oriented to person, place and time, the affect was normal, the mood was normal and not anxious [de-identified] : bilateral mastectomy/YESICA flap reconstruction

## 2025-03-13 NOTE — REVIEW OF SYSTEMS
[Negative] : Heme/Lymph [Alopecia: Grade 2 - Hair loss of >=50% normal for that individual that is readily apparent to others; a wig or hair piece is necessary if the patient desires to completely camouflage the hair loss; associated with psychosocial impact] : Alopecia: Grade 2 - Hair loss of >=50% normal for that individual that is readily apparent to others; a wig or hair piece is necessary if the patient desires to completely camouflage the hair loss; associated with psychosocial impact

## 2025-03-13 NOTE — HISTORY OF PRESENT ILLNESS
[FreeTextEntry1] : Ms. Elizalde presents today for consideration for radiation therapy for breast cancer, referred by Dr. Pelayo.  Bilateral breast ultrasound done on 7/31/2024 showed: In the area of palpable concern in the left 4 oclock axis, 5-7 cm from the nipple there are 2 irregular hypoechoic solid masses measuring 1.5 and 1.7 cm with suspicious morphologic features corresponding with the spiculated masses on mammography. Abnormal hypoechoic left axillary node.   8/5/2024 left breast biopsy showed: 1.  Breast, core biopsy (LEFT, 5:00):      -   INFILTRATING DUCTAL CARCINOMA, Disha score 3+ 2+ 2 = 7/9         ER/HI+, Her2 neg. 2.  Lymph node, core biopsy (LEFT axillary):      -   METASTATIC CARCINOMA.  PET scan 8/20/2024 showed: 1. FDG avid left breast cancer and metastatic left axillary lymphadenopathy. No distant FDG avid disease. 2. Incidental note of FDG avid nodule in the region of the posterior left thyroid. Recommend correlation with ultrasound to evaluate for signs of thyroid malignancy.  thyroid ultrasound 8/28/2024 showed: Multiple thyroid nodules consistent with multinodular goiter. A 1.3 cm isoechoic nodule in the mid to lower pole of the left lobe which likely corresponds to the FDG avid lesion seen on the prior PET/CT. Since it is FDG avid, consider further evaluation with ultrasound-guided fine-needle aspiration  10/10/2024 she underwent bilateral total mastectomy (Dr Pelayo) and breast reconstruction with YESICA flaps (Dr Benito). Pathology revealed: 1.  Breast, right, total mastectomy-  Benign 2.  Lymph node, left axilla, biopsy -   One lymph node negative for metastatic carcinoma (0/1). 3.  Axillary fat pad, left, dissection -   Four of twelve lymph nodes positive for metastatic carcinoma (4/12). -   The largest metastatic focus measures 22.0 mm in greatest dimension. -   Extranodal extension is present (extent 5.0 mm). 4.  Breast, left, posterior margin, biopsy -   Benign fibroadipose tissue. 5.  Breast, left, total mastectomy -   Invasive ductal carcinoma, Disha grade 2 (tubule formation: 3, nuclear pleomorphism: 2, mitotic rate: 2; total score 7/9). -   The invasive tumor measures 37.0 mm in greatest dimension. -   Ductal carcinoma in situ, nuclear grade 2, cribriform, micropapillary and papillary types with necrosis and calcifications.  The DCIS spans a distance of approximately 40.0 mm in greatest dimension and is present in 4 of 9 slides. -   No lymphovascular invasion identified. -   The resection margins are negative for carcinoma.  The invasive tumor is 2.0 mm from the nearest margin (posterior) and 3.0 mm from the anterior margin.  DCIS is 6.0 mm from the nearest margin (posterior). -   Nipple and skin negative for carcinoma. -   Complex sclerosing lesion, focally involved by DCIS. -   Intraductal papillomata, focally involved by DCIS. 6.  Internal mammary lymph node, right, biopsy -   Three lymph nodes negative for metastatic carcinoma (0/3). 7.  Internal mammary lymph node, left, biopsy -   One lymph node negative for metastatic carcinoma (0/1).  Genetic testing Ambry negative  Dr. Cueva prescribed dose-dense AC followed by Taxol chemotherapy, initiated on November 19, 2024, and completed on March 6, 2025.   She tolerated well, but has some neuropathy and alopecia.  Dr. Cueva also recommends a 10-year course of an aromatase inhibitor. The addition of a CDK 4/6 inhibitor for 2-3 years will be considered after radiation therapy.  In Summary: This patient is a 60-year-old postmenopausal female with stage IIA ([id number]), moderately differentiated, invasive ductal carcinoma of the left breast, involving the lower outer quadrant. She is ER/HI positive and Her2 negative. She underwent left mastectomy, axillary lymph node dissection (ALND), and YESICA flap breast reconstruction. She has completed postop adjuvant chemtox dose-dense AC and T.  Four positive left axillary lymph nodes with extranodal extension (DEE) place her at risk for locoregional recurrence, therefore adjuvant radiation therapy is recommended.  3/13/25: Patient presents for followup.  She tolerated chemotherapy well, denying fatigue, pain, swelling, numbness/tingling, signs/symptoms of infection, nausea, or vomiting. She continues to follow with Dr. Pelayo (next appointment 04/21/25) and Dr. Cueva (next appointment 03/24/25). She is considering cosmetic revision with Dr. Benito after completing radiation therapy.

## 2025-03-13 NOTE — DISEASE MANAGEMENT
[Pathological] : TNM Stage: p [IIA] : IIA [FreeTextEntry4] : LEFT breast cancer, ER/NM+, Her2 neg [TTNM] : 2 [NTNM] : 2a [MTNM] : 0

## 2025-03-13 NOTE — REASON FOR VISIT
[Routine Follow-Up] : routine follow-up visit for [Breast Cancer] : breast cancer [Spouse] : spouse [Consideration of Curative Therapy] : consideration of curative therapy for

## 2025-03-24 NOTE — HISTORY OF PRESENT ILLNESS
[Disease: _____________________] : Disease: [unfilled] [T: ___] : T[unfilled] [N: ___] : N[unfilled] [AJCC Stage: ____] : AJCC Stage: [unfilled] [de-identified] : 59 y/o female with recently diagnoseed let breast cancer. Presented with an abnormal screening mammogram   Presented in July 2024 with a palpable mass in the left breast. Mammogram  and sono 7/31/2024: In the area of palpable concern in the left 4 oclock axis, 5-7 cm from the nipple there are 2 irregular hypoechoic solid masses measuring 1.5 and 1.7 cm with suspicious morphologic features corresponding with the spiculated masses on mammography. Abnormal hypoechoic left axillary node.  8/5/2024 left breast core biopsy (LEFT, 5:00):  - INFILTRATING DUCTAL CARCINOMA, [place] score 3+ 2+ 2 = 7/9   ER/PA+, Her2 neg. 2. Lymph node, core biopsy (LEFT axillary):  - METASTATIC CARCINOMA.  PET scan 8/20/2024 showed: 1. FDG avid left breast cancer and metastatic left axillary lymphadenopathy. No distant FDG avid disease. 2. Incidental note of FDG avid nodule in the region of the posterior left thyroid. Recommend correlation with ultrasound to evaluate for signs of thyroid malignancy.  thyroid ultrasound 8/28/2024 :  Multiple thyroid nodules consistent with multinodular goiter. A 1.3 cm isoechoic nodule in the mid to lower pole of the left lobe which likely corresponds to the FDG avid lesion seen on the prior PET/CT. Since it is FDG avid, consider further evaluation with ultrasound-guided fine-needle aspiration  10/10/2024 she underwent bilateral  mastectomy (Dr Pelayo) and breast reconstruction with YESICA flaps (Dr Benito).  Pathology revealed: Breast, right, total mastectomy- Benign Lymph node, left axilla, biopsy - One lymph node negative for metastatic carcinoma (0/1). Axillary fat pad, left, dissection  - Four of twelve lymph nodes positive for metastatic carcinoma  (4/12). - The largest metastatic focus measures 22.0 mm in greatest dimension. - Extranodal extension is present (extent 5.0 mm). Breast, left, total mastectomy - Invasive ductal carcinoma, [place] grade 2 (tubule formation: 3, nuclear pleomorphism: 2, mitotic rate: 2; total score 7/9). - The invasive tumor measures 37.0 mm in greatest dimension. - Ductal carcinoma in situ, nuclear grade 2, cribriform, micropapillary and papillary types with necrosis and calcifications.  - The resection margins are negative for carcinoma. The invasive tumor is 2.0 mm from the nearest margin (posterior) and 3.0 mm from the anterior margin. DCIS is 6.0 mm from the nearest margin (posterior). Internal mammary lymph node, right, biopsy:   Internal mammary lymph node, left, biopsy - One lymph node negative for metastatic carcinoma (0/1).   medical Hx : HTN and hyperlipidemia. LIves at home with her . Works as a .   11/19/24- 3/6/25  ELSIE AC- T    [de-identified] : invasive ductal cancer grade 2 ( 7/9)  [de-identified] : ER   AR   HER2  0  negative  [de-identified] : Genetic testing Ambry  negative  [de-identified] : Taxol 4/4 on 3/6/25

## 2025-03-24 NOTE — ASSESSMENT
[FreeTextEntry1] : 59 y/o female with left breast cancer - invasive ductal  mod diff strongly ER and SC positive, HER 2 negative , metastatic to 4/ 17  axillary  LNs with extracapsular extension p T2  pN2a  prognostic stage II A s/p bilateral mastectomies L ALND and YESICA reconstruction on 10/10/24 ( Dr Pelayo, Dr Benito)   High risk of systemic recurrence. Plan :  Adjuvant chemotherapy DD AC-T followed by adjuvant  radiation, hormonal therapy - AI x 10 yeras and CDK4/6 inhibitor x 2-3 years.  11/19/24  started DD ACT  Completed chemotherapy ( Taxol 4/4) on 3/6/25..  Dr Leo to start RT  after chemo completed.   Discussed  adjuvant hormonal therapy- discussed potential side effects of AI. Can start AI after chemo- or start after RT completed ( will check with Rad Onc). CDK4/6 inhibitor- will discuss after RT completed.  She will need baseline bone density later this year ( not urgent).  Answered multiple questions re diet/ supplements.   Discussed port removal- she is considering having  port removed either before and right after RT completed.   Exam after cycle 4 of Taxol.

## 2025-04-07 NOTE — DISEASE MANAGEMENT
[FreeTextEntry4] : LEFT breast cancer, ER/SD+, Her2 neg [TTNM] : 2 [NTNM] : 2a [MTNM] : 0 [de-identified] : 142 [de-identified] : 4414 [de-identified] : Left Breast/Nodes

## 2025-04-07 NOTE — HISTORY OF PRESENT ILLNESS
[FreeTextEntry1] : Ms. Elizalde presents today for consideration for radiation therapy for breast cancer, referred by Dr. Pelayo.  Bilateral breast ultrasound done on 7/31/2024 showed: In the area of palpable concern in the left 4 oclock axis, 5-7 cm from the nipple there are 2 irregular hypoechoic solid masses measuring 1.5 and 1.7 cm with suspicious morphologic features corresponding with the spiculated masses on mammography. Abnormal hypoechoic left axillary node.   8/5/2024 left breast biopsy showed: 1.  Breast, core biopsy (LEFT, 5:00):      -   INFILTRATING DUCTAL CARCINOMA, Disha score 3+ 2+ 2 = 7/9         ER/[place]+, Her2 neg. 2.  Lymph node, core biopsy (LEFT axillary):      -   METASTATIC CARCINOMA.  PET scan 8/20/2024 showed: 1. FDG avid left breast cancer and metastatic left axillary lymphadenopathy. No distant FDG avid disease. 2. Incidental note of FDG avid nodule in the region of the posterior left thyroid. Recommend correlation with ultrasound to evaluate for signs of thyroid malignancy.  thyroid ultrasound 8/28/2024 showed: Multiple thyroid nodules consistent with multinodular goiter. A 1.3 cm isoechoic nodule in the mid to lower pole of the left lobe which likely corresponds to the FDG avid lesion seen on the prior PET/CT. Since it is FDG avid, consider further evaluation with ultrasound-guided fine-needle aspiration  10/10/2024 she underwent bilateral total mastectomy (Dr Pelayo) and breast reconstruction with YESICA flaps (Dr Benito). Pathology revealed: 1.  Breast, right, total mastectomy-  Benign 2.  Lymph node, left axilla, biopsy -   One lymph node negative for metastatic carcinoma (0/1). 3.  Axillary fat pad, left, dissection -   Four of twelve lymph nodes positive for metastatic carcinoma (4/12). -   The largest metastatic focus measures 22.0 mm in greatest dimension. -   Extranodal extension is present (extent 5.0 mm). 4.  Breast, left, posterior margin, biopsy -   Benign fibroadipose tissue. 5.  Breast, left, total mastectomy -   Invasive ductal carcinoma, Disha grade 2 (tubule formation: 3, nuclear pleomorphism: 2, mitotic rate: 2; total score 7/9). -   The invasive tumor measures 37.0 mm in greatest dimension. -   Ductal carcinoma in situ, nuclear grade 2, cribriform, micropapillary and papillary types with necrosis and calcifications.  The DCIS spans a distance of approximately 40.0 mm in greatest dimension and is present in 4 of 9 slides. -   No lymphovascular invasion identified. -   The resection margins are negative for carcinoma.  The invasive tumor is 2.0 mm from the nearest margin (posterior) and 3.0 mm from the anterior margin.  DCIS is 6.0 mm from the nearest margin (posterior). -   Nipple and skin negative for carcinoma. -   Complex sclerosing lesion, focally involved by DCIS. -   Intraductal papillomata, focally involved by DCIS. 6.  Internal mammary lymph node, right, biopsy -   Three lymph nodes negative for metastatic carcinoma (0/3). 7.  Internal mammary lymph node, left, biopsy -   One lymph node negative for metastatic carcinoma (0/1).  Genetic testing Ambry negative  Dr. Cueva prescribed dose-dense AC followed by Taxol chemotherapy, initiated on November 19, 2024, and completed on March 6, 2025.   She tolerated well, but has some neuropathy and alopecia.  Dr. Cueva also recommends a 10-year course of an aromatase inhibitor. The addition of a CDK 4/6 inhibitor for 2-3 years will be considered after radiation therapy.  3/13/25: Patient presents for followup.  She tolerated chemotherapy well, denying fatigue, pain, swelling, numbness/tingling, signs/symptoms of infection, nausea, or vomiting. She continues to follow with Dr. Pelayo (next appointment 04/21/25) and Dr. Cueva (next appointment 03/24/25). She is considering cosmetic revision with Dr. Benito after completing radiation therapy.  In Summary: 59 y/o postmenopausal female with stage IIA, [id number], mod diff invasive ductal ca of LEFT breast LOQ, ER/[place]+, Her2 neg. She has undergone mastectomy, ALND, and YESICA reconstruction. She has completed dose dense AC and T chemotx. Patient is at risk for locoregional disease recurrence given the 4 positive LEFT axillary LN's with DEE. Recommended post mastectomy adjuvant RT. Plan for 50 Gy in 25 fx to left anterior chest wall and draining lissa regions (to include axilla/supraclav/IMN's).  Patient is currently undergoing post mastectomy adjuvant RT  04/07/2025 Patient presents for OTV, fx 4/25 to Left breast and nodes. Reports doing well, denies fatigue, breast or skin changes. Moisturizing with Lubriderm.

## 2025-04-07 NOTE — DISEASE MANAGEMENT
[FreeTextEntry4] : LEFT breast cancer, ER/[place]+, Her2 neg [TTNM] : 2 [NTNM] : 2a [MTNM] : 0 [de-identified] : 670 [de-identified] : 3155 [de-identified] : Left Breast/Nodes

## 2025-04-07 NOTE — HISTORY OF PRESENT ILLNESS
[FreeTextEntry1] : Ms. Elizalde presents today for consideration for radiation therapy for breast cancer, referred by Dr. Pelayo.  Bilateral breast ultrasound done on 7/31/2024 showed: In the area of palpable concern in the left 4 oclock axis, 5-7 cm from the nipple there are 2 irregular hypoechoic solid masses measuring 1.5 and 1.7 cm with suspicious morphologic features corresponding with the spiculated masses on mammography. Abnormal hypoechoic left axillary node.   8/5/2024 left breast biopsy showed: 1.  Breast, core biopsy (LEFT, 5:00):      -   INFILTRATING DUCTAL CARCINOMA, Disha score 3+ 2+ 2 = 7/9         ER/[place]+, Her2 neg. 2.  Lymph node, core biopsy (LEFT axillary):      -   METASTATIC CARCINOMA.  PET scan 8/20/2024 showed: 1. FDG avid left breast cancer and metastatic left axillary lymphadenopathy. No distant FDG avid disease. 2. Incidental note of FDG avid nodule in the region of the posterior left thyroid. Recommend correlation with ultrasound to evaluate for signs of thyroid malignancy.  thyroid ultrasound 8/28/2024 showed: Multiple thyroid nodules consistent with multinodular goiter. A 1.3 cm isoechoic nodule in the mid to lower pole of the left lobe which likely corresponds to the FDG avid lesion seen on the prior PET/CT. Since it is FDG avid, consider further evaluation with ultrasound-guided fine-needle aspiration  10/10/2024 she underwent bilateral total mastectomy (Dr Pelayo) and breast reconstruction with YESICA flaps (Dr Benito). Pathology revealed: 1.  Breast, right, total mastectomy-  Benign 2.  Lymph node, left axilla, biopsy -   One lymph node negative for metastatic carcinoma (0/1). 3.  Axillary fat pad, left, dissection -   Four of twelve lymph nodes positive for metastatic carcinoma (4/12). -   The largest metastatic focus measures 22.0 mm in greatest dimension. -   Extranodal extension is present (extent 5.0 mm). 4.  Breast, left, posterior margin, biopsy -   Benign fibroadipose tissue. 5.  Breast, left, total mastectomy -   Invasive ductal carcinoma, Disha grade 2 (tubule formation: 3, nuclear pleomorphism: 2, mitotic rate: 2; total score 7/9). -   The invasive tumor measures 37.0 mm in greatest dimension. -   Ductal carcinoma in situ, nuclear grade 2, cribriform, micropapillary and papillary types with necrosis and calcifications.  The DCIS spans a distance of approximately 40.0 mm in greatest dimension and is present in 4 of 9 slides. -   No lymphovascular invasion identified. -   The resection margins are negative for carcinoma.  The invasive tumor is 2.0 mm from the nearest margin (posterior) and 3.0 mm from the anterior margin.  DCIS is 6.0 mm from the nearest margin (posterior). -   Nipple and skin negative for carcinoma. -   Complex sclerosing lesion, focally involved by DCIS. -   Intraductal papillomata, focally involved by DCIS. 6.  Internal mammary lymph node, right, biopsy -   Three lymph nodes negative for metastatic carcinoma (0/3). 7.  Internal mammary lymph node, left, biopsy -   One lymph node negative for metastatic carcinoma (0/1).  Genetic testing Ambry negative  Dr. Cueva prescribed dose-dense AC followed by Taxol chemotherapy, initiated on November 19, 2024, and completed on March 6, 2025.   She tolerated well, but has some neuropathy and alopecia.  Dr. Cueva also recommends a 10-year course of an aromatase inhibitor. The addition of a CDK 4/6 inhibitor for 2-3 years will be considered after radiation therapy.  3/13/25: Patient presents for followup.  She tolerated chemotherapy well, denying fatigue, pain, swelling, numbness/tingling, signs/symptoms of infection, nausea, or vomiting. She continues to follow with Dr. Pelayo (next appointment 04/21/25) and Dr. Cueva (next appointment 03/24/25). She is considering cosmetic revision with Dr. Benito after completing radiation therapy.  In Summary: 61 y/o postmenopausal female with stage IIA, [id number], mod diff invasive ductal ca of LEFT breast LOQ, ER/[place]+, Her2 neg. She has undergone mastectomy, ALND, and YESICA reconstruction. She has completed dose dense AC and T chemotx. Patient is at risk for locoregional disease recurrence given the 4 positive LEFT axillary LN's with DEE. Recommended post mastectomy adjuvant RT. Plan for 50 Gy in 25 fx to left anterior chest wall and draining lissa regions (to include axilla/supraclav/IMN's).  Patient is currently undergoing post mastectomy adjuvant RT  04/07/2025 Patient presents for OTV, fx 4/25 to Left breast and nodes. Reports doing well, denies fatigue, breast or skin changes. Moisturizing with Lubriderm.

## 2025-04-14 NOTE — HISTORY OF PRESENT ILLNESS
[FreeTextEntry1] : Ms. Elizalde presents today for consideration for radiation therapy for breast cancer, referred by Dr. Pelayo.  Bilateral breast ultrasound done on 7/31/2024 showed: In the area of palpable concern in the left 4 oclock axis, 5-7 cm from the nipple there are 2 irregular hypoechoic solid masses measuring 1.5 and 1.7 cm with suspicious morphologic features corresponding with the spiculated masses on mammography. Abnormal hypoechoic left axillary node.   8/5/2024 left breast biopsy showed: 1.  Breast, core biopsy (LEFT, 5:00):      -   INFILTRATING DUCTAL CARCINOMA, Disha score 3+ 2+ 2 = 7/9         ER/[place]+, Her2 neg. 2.  Lymph node, core biopsy (LEFT axillary):      -   METASTATIC CARCINOMA.  PET scan 8/20/2024 showed: 1. FDG avid left breast cancer and metastatic left axillary lymphadenopathy. No distant FDG avid disease. 2. Incidental note of FDG avid nodule in the region of the posterior left thyroid. Recommend correlation with ultrasound to evaluate for signs of thyroid malignancy.  thyroid ultrasound 8/28/2024 showed: Multiple thyroid nodules consistent with multinodular goiter. A 1.3 cm isoechoic nodule in the mid to lower pole of the left lobe which likely corresponds to the FDG avid lesion seen on the prior PET/CT. Since it is FDG avid, consider further evaluation with ultrasound-guided fine-needle aspiration  10/10/2024 she underwent bilateral total mastectomy (Dr Pelayo) and breast reconstruction with YESICA flaps (Dr Benito). Pathology revealed: 1.  Breast, right, total mastectomy-  Benign 2.  Lymph node, left axilla, biopsy -   One lymph node negative for metastatic carcinoma (0/1). 3.  Axillary fat pad, left, dissection -   Four of twelve lymph nodes positive for metastatic carcinoma (4/12). -   The largest metastatic focus measures 22.0 mm in greatest dimension. -   Extranodal extension is present (extent 5.0 mm). 4.  Breast, left, posterior margin, biopsy -   Benign fibroadipose tissue. 5.  Breast, left, total mastectomy -   Invasive ductal carcinoma, Disha grade 2 (tubule formation: 3, nuclear pleomorphism: 2, mitotic rate: 2; total score 7/9). -   The invasive tumor measures 37.0 mm in greatest dimension. -   Ductal carcinoma in situ, nuclear grade 2, cribriform, micropapillary and papillary types with necrosis and calcifications.  The DCIS spans a distance of approximately 40.0 mm in greatest dimension and is present in 4 of 9 slides. -   No lymphovascular invasion identified. -   The resection margins are negative for carcinoma.  The invasive tumor is 2.0 mm from the nearest margin (posterior) and 3.0 mm from the anterior margin.  DCIS is 6.0 mm from the nearest margin (posterior). -   Nipple and skin negative for carcinoma. -   Complex sclerosing lesion, focally involved by DCIS. -   Intraductal papillomata, focally involved by DCIS. 6.  Internal mammary lymph node, right, biopsy -   Three lymph nodes negative for metastatic carcinoma (0/3). 7.  Internal mammary lymph node, left, biopsy -   One lymph node negative for metastatic carcinoma (0/1).  Genetic testing Ambry negative  Dr. Cueva prescribed dose-dense AC followed by Taxol chemotherapy, initiated on November 19, 2024, and completed on March 6, 2025.   She tolerated well, but has some neuropathy and alopecia.  Dr. Cueva also recommends a 10-year course of an aromatase inhibitor. The addition of a CDK 4/6 inhibitor for 2-3 years will be considered after radiation therapy.  3/13/25: Patient presents for followup.  She tolerated chemotherapy well, denying fatigue, pain, swelling, numbness/tingling, signs/symptoms of infection, nausea, or vomiting. She continues to follow with Dr. Pelayo (next appointment 04/21/25) and Dr. Cueva (next appointment 03/24/25). She is considering cosmetic revision with Dr. Benito after completing radiation therapy.  In Summary: 59 y/o postmenopausal female with stage IIA, [id number], mod diff invasive ductal ca of LEFT breast LOQ, ER/[place]+, Her2 neg. She has undergone mastectomy, ALND, and YESICA reconstruction. She has completed dose dense AC and T chemotx. Patient is at risk for locoregional disease recurrence given the 4 positive LEFT axillary LN's with DEE. Recommended post mastectomy adjuvant RT. Plan for 50 Gy in 25 fx to left anterior chest wall and draining lissa regions (to include axilla/supraclav/IMN's).  Patient is currently undergoing post mastectomy adjuvant RT  04/07/2025 Patient presents for OTV, fx 4/25 to Left breast and nodes. Reports doing well, denies fatigue, breast or skin changes. Moisturizing with Lubriderm.   04/14/2025 Patient presents for OTV, fx 9/20 to left breast and nodes. Patient denies breast/axilla soreness, pain, skin changes such as hyperpigmentation or erythema, nipple sensitivity, breast swelling, or new masses. Using Lubriderm to moisturize BID.

## 2025-04-14 NOTE — HISTORY OF PRESENT ILLNESS
[FreeTextEntry1] : Ms. Elizalde presents today for consideration for radiation therapy for breast cancer, referred by Dr. Pelayo.  Bilateral breast ultrasound done on 7/31/2024 showed: In the area of palpable concern in the left 4 oclock axis, 5-7 cm from the nipple there are 2 irregular hypoechoic solid masses measuring 1.5 and 1.7 cm with suspicious morphologic features corresponding with the spiculated masses on mammography. Abnormal hypoechoic left axillary node.   8/5/2024 left breast biopsy showed: 1.  Breast, core biopsy (LEFT, 5:00):      -   INFILTRATING DUCTAL CARCINOMA, Disha score 3+ 2+ 2 = 7/9         ER/LA+, Her2 neg. 2.  Lymph node, core biopsy (LEFT axillary):      -   METASTATIC CARCINOMA.  PET scan 8/20/2024 showed: 1. FDG avid left breast cancer and metastatic left axillary lymphadenopathy. No distant FDG avid disease. 2. Incidental note of FDG avid nodule in the region of the posterior left thyroid. Recommend correlation with ultrasound to evaluate for signs of thyroid malignancy.  thyroid ultrasound 8/28/2024 showed: Multiple thyroid nodules consistent with multinodular goiter. A 1.3 cm isoechoic nodule in the mid to lower pole of the left lobe which likely corresponds to the FDG avid lesion seen on the prior PET/CT. Since it is FDG avid, consider further evaluation with ultrasound-guided fine-needle aspiration  10/10/2024 she underwent bilateral total mastectomy (Dr Pelayo) and breast reconstruction with YESICA flaps (Dr Benito). Pathology revealed: 1.  Breast, right, total mastectomy-  Benign 2.  Lymph node, left axilla, biopsy -   One lymph node negative for metastatic carcinoma (0/1). 3.  Axillary fat pad, left, dissection -   Four of twelve lymph nodes positive for metastatic carcinoma (4/12). -   The largest metastatic focus measures 22.0 mm in greatest dimension. -   Extranodal extension is present (extent 5.0 mm). 4.  Breast, left, posterior margin, biopsy -   Benign fibroadipose tissue. 5.  Breast, left, total mastectomy -   Invasive ductal carcinoma, Disha grade 2 (tubule formation: 3, nuclear pleomorphism: 2, mitotic rate: 2; total score 7/9). -   The invasive tumor measures 37.0 mm in greatest dimension. -   Ductal carcinoma in situ, nuclear grade 2, cribriform, micropapillary and papillary types with necrosis and calcifications.  The DCIS spans a distance of approximately 40.0 mm in greatest dimension and is present in 4 of 9 slides. -   No lymphovascular invasion identified. -   The resection margins are negative for carcinoma.  The invasive tumor is 2.0 mm from the nearest margin (posterior) and 3.0 mm from the anterior margin.  DCIS is 6.0 mm from the nearest margin (posterior). -   Nipple and skin negative for carcinoma. -   Complex sclerosing lesion, focally involved by DCIS. -   Intraductal papillomata, focally involved by DCIS. 6.  Internal mammary lymph node, right, biopsy -   Three lymph nodes negative for metastatic carcinoma (0/3). 7.  Internal mammary lymph node, left, biopsy -   One lymph node negative for metastatic carcinoma (0/1).  Genetic testing Ambry negative  Dr. Cueva prescribed dose-dense AC followed by Taxol chemotherapy, initiated on November 19, 2024, and completed on March 6, 2025.   She tolerated well, but has some neuropathy and alopecia.  Dr. Cueva also recommends a 10-year course of an aromatase inhibitor. The addition of a CDK 4/6 inhibitor for 2-3 years will be considered after radiation therapy.  3/13/25: Patient presents for followup.  She tolerated chemotherapy well, denying fatigue, pain, swelling, numbness/tingling, signs/symptoms of infection, nausea, or vomiting. She continues to follow with Dr. Pelayo (next appointment 04/21/25) and Dr. Cueva (next appointment 03/24/25). She is considering cosmetic revision with Dr. Benito after completing radiation therapy.  In Summary: 59 y/o postmenopausal female with stage IIA, [id number], mod diff invasive ductal ca of LEFT breast LOQ, ER/LA+, Her2 neg. She has undergone mastectomy, ALND, and YESICA reconstruction. She has completed dose dense AC and T chemotx. Patient is at risk for locoregional disease recurrence given the 4 positive LEFT axillary LN's with DEE. Recommended post mastectomy adjuvant RT. Plan for 50 Gy in 25 fx to left anterior chest wall and draining lissa regions (to include axilla/supraclav/IMN's).  Patient is currently undergoing post mastectomy adjuvant RT  04/07/2025 Patient presents for OTV, fx 4/25 to Left breast and nodes. Reports doing well, denies fatigue, breast or skin changes. Moisturizing with Lubriderm.   04/14/2025 Patient presents for OTV, fx 9/20 to left breast and nodes. Patient denies breast/axilla soreness, pain, skin changes such as hyperpigmentation or erythema, nipple sensitivity, breast swelling, or new masses. Using Lubriderm to moisturize BID.

## 2025-04-14 NOTE — DISEASE MANAGEMENT
[Pathological] : TNM Stage: p [IIA] : IIA [FreeTextEntry4] : LEFT breast cancer, ER/DC+, Her2 neg [TTNM] : 2 [NTNM] : 2a [MTNM] : 0 [de-identified] : 3839 [de-identified] : 2647 [de-identified] : Left Breast/Nodes

## 2025-04-23 NOTE — HISTORY OF PRESENT ILLNESS
[FreeTextEntry1] : Ms. Elizalde presents today for consideration for radiation therapy for breast cancer, referred by Dr. Pelayo.  Bilateral breast ultrasound done on 7/31/2024 showed: In the area of palpable concern in the left 4 oclock axis, 5-7 cm from the nipple there are 2 irregular hypoechoic solid masses measuring 1.5 and 1.7 cm with suspicious morphologic features corresponding with the spiculated masses on mammography. Abnormal hypoechoic left axillary node.   8/5/2024 left breast biopsy showed: 1.  Breast, core biopsy (LEFT, 5:00):      -   INFILTRATING DUCTAL CARCINOMA, Disha score 3+ 2+ 2 = 7/9         ER/NV+, Her2 neg. 2.  Lymph node, core biopsy (LEFT axillary):      -   METASTATIC CARCINOMA.  PET scan 8/20/2024 showed: 1. FDG avid left breast cancer and metastatic left axillary lymphadenopathy. No distant FDG avid disease. 2. Incidental note of FDG avid nodule in the region of the posterior left thyroid. Recommend correlation with ultrasound to evaluate for signs of thyroid malignancy.  thyroid ultrasound 8/28/2024 showed: Multiple thyroid nodules consistent with multinodular goiter. A 1.3 cm isoechoic nodule in the mid to lower pole of the left lobe which likely corresponds to the FDG avid lesion seen on the prior PET/CT. Since it is FDG avid, consider further evaluation with ultrasound-guided fine-needle aspiration  10/10/2024 she underwent bilateral total mastectomy (Dr Pelayo) and breast reconstruction with YESICA flaps (Dr Benito). Pathology revealed: 1.  Breast, right, total mastectomy-  Benign 2.  Lymph node, left axilla, biopsy -   One lymph node negative for metastatic carcinoma (0/1). 3.  Axillary fat pad, left, dissection -   Four of twelve lymph nodes positive for metastatic carcinoma (4/12). -   The largest metastatic focus measures 22.0 mm in greatest dimension. -   Extranodal extension is present (extent 5.0 mm). 4.  Breast, left, posterior margin, biopsy -   Benign fibroadipose tissue. 5.  Breast, left, total mastectomy -   Invasive ductal carcinoma, Disha grade 2 (tubule formation: 3, nuclear pleomorphism: 2, mitotic rate: 2; total score 7/9). -   The invasive tumor measures 37.0 mm in greatest dimension. -   Ductal carcinoma in situ, nuclear grade 2, cribriform, micropapillary and papillary types with necrosis and calcifications.  The DCIS spans a distance of approximately 40.0 mm in greatest dimension and is present in 4 of 9 slides. -   No lymphovascular invasion identified. -   The resection margins are negative for carcinoma.  The invasive tumor is 2.0 mm from the nearest margin (posterior) and 3.0 mm from the anterior margin.  DCIS is 6.0 mm from the nearest margin (posterior). -   Nipple and skin negative for carcinoma. -   Complex sclerosing lesion, focally involved by DCIS. -   Intraductal papillomata, focally involved by DCIS. 6.  Internal mammary lymph node, right, biopsy -   Three lymph nodes negative for metastatic carcinoma (0/3). 7.  Internal mammary lymph node, left, biopsy -   One lymph node negative for metastatic carcinoma (0/1).  Genetic testing Ambry negative  Dr. Cueva prescribed dose-dense AC followed by Taxol chemotherapy, initiated on November 19, 2024, and completed on March 6, 2025.   She tolerated well, but has some neuropathy and alopecia.  Dr. Cueva also recommends a 10-year course of an aromatase inhibitor. The addition of a CDK 4/6 inhibitor for 2-3 years will be considered after radiation therapy.  3/13/25: Patient presents for followup.  She tolerated chemotherapy well, denying fatigue, pain, swelling, numbness/tingling, signs/symptoms of infection, nausea, or vomiting. She continues to follow with Dr. Pelayo (next appointment 04/21/25) and Dr. Cueva (next appointment 03/24/25). She is considering cosmetic revision with Dr. Benito after completing radiation therapy.  In Summary: 61 y/o postmenopausal female with stage IIA, [id number], mod diff invasive ductal ca of LEFT breast LOQ, ER/NV+, Her2 neg. She has undergone mastectomy, ALND, and YESICA reconstruction. She has completed dose dense AC and T chemotx. Patient is at risk for locoregional disease recurrence given the 4 positive LEFT axillary LN's with DEE. Recommended post mastectomy adjuvant RT. Plan for 50 Gy in 25 fx to left anterior chest wall and draining lissa regions (to include axilla/supraclav/IMN's).  Patient is currently undergoing post mastectomy adjuvant RT  04/07/2025 Patient presents for OTV, fx 4/25 to Left breast and nodes. Reports doing well, denies fatigue, breast or skin changes. Moisturizing with Lubriderm.   04/14/2025 Patient presents for OTV, fx 9/20 to left breast and nodes. Patient denies breast/axilla soreness, pain, skin changes such as hyperpigmentation or erythema, nipple sensitivity, breast swelling, or new masses. Using Lubriderm to moisturize BID.   04/23/2025 Patient presents for OTV, fx 16/25 to left breast/nodes. Tolerating RT well. The patient reports no breast or axilla pain, soreness, nipple sensitivity, swelling, or new masses. She is using Lubriderm moisturizer twice a day.

## 2025-04-23 NOTE — DISEASE MANAGEMENT
[Pathological] : TNM Stage: p [IIA] : IIA [FreeTextEntry4] : LEFT breast cancer, ER/CO+, Her2 neg [TTNM] : 2 [NTNM] : 2a [MTNM] : 0 [de-identified] : 8715 [de-identified] : 1856 [de-identified] : Left Breast/Nodes

## 2025-04-23 NOTE — DISEASE MANAGEMENT
[Pathological] : TNM Stage: p [IIA] : IIA [FreeTextEntry4] : LEFT breast cancer, ER/OR+, Her2 neg [TTNM] : 2 [NTNM] : 2a [MTNM] : 0 [de-identified] : 2109 [de-identified] : 1554 [de-identified] : Left Breast/Nodes

## 2025-04-23 NOTE — HISTORY OF PRESENT ILLNESS
[FreeTextEntry1] : Ms. Elizalde presents today for consideration for radiation therapy for breast cancer, referred by Dr. Pelayo.  Bilateral breast ultrasound done on 7/31/2024 showed: In the area of palpable concern in the left 4 oclock axis, 5-7 cm from the nipple there are 2 irregular hypoechoic solid masses measuring 1.5 and 1.7 cm with suspicious morphologic features corresponding with the spiculated masses on mammography. Abnormal hypoechoic left axillary node.   8/5/2024 left breast biopsy showed: 1.  Breast, core biopsy (LEFT, 5:00):      -   INFILTRATING DUCTAL CARCINOMA, Disha score 3+ 2+ 2 = 7/9         ER/NV+, Her2 neg. 2.  Lymph node, core biopsy (LEFT axillary):      -   METASTATIC CARCINOMA.  PET scan 8/20/2024 showed: 1. FDG avid left breast cancer and metastatic left axillary lymphadenopathy. No distant FDG avid disease. 2. Incidental note of FDG avid nodule in the region of the posterior left thyroid. Recommend correlation with ultrasound to evaluate for signs of thyroid malignancy.  thyroid ultrasound 8/28/2024 showed: Multiple thyroid nodules consistent with multinodular goiter. A 1.3 cm isoechoic nodule in the mid to lower pole of the left lobe which likely corresponds to the FDG avid lesion seen on the prior PET/CT. Since it is FDG avid, consider further evaluation with ultrasound-guided fine-needle aspiration  10/10/2024 she underwent bilateral total mastectomy (Dr Pelayo) and breast reconstruction with YESICA flaps (Dr Benito). Pathology revealed: 1.  Breast, right, total mastectomy-  Benign 2.  Lymph node, left axilla, biopsy -   One lymph node negative for metastatic carcinoma (0/1). 3.  Axillary fat pad, left, dissection -   Four of twelve lymph nodes positive for metastatic carcinoma (4/12). -   The largest metastatic focus measures 22.0 mm in greatest dimension. -   Extranodal extension is present (extent 5.0 mm). 4.  Breast, left, posterior margin, biopsy -   Benign fibroadipose tissue. 5.  Breast, left, total mastectomy -   Invasive ductal carcinoma, Disha grade 2 (tubule formation: 3, nuclear pleomorphism: 2, mitotic rate: 2; total score 7/9). -   The invasive tumor measures 37.0 mm in greatest dimension. -   Ductal carcinoma in situ, nuclear grade 2, cribriform, micropapillary and papillary types with necrosis and calcifications.  The DCIS spans a distance of approximately 40.0 mm in greatest dimension and is present in 4 of 9 slides. -   No lymphovascular invasion identified. -   The resection margins are negative for carcinoma.  The invasive tumor is 2.0 mm from the nearest margin (posterior) and 3.0 mm from the anterior margin.  DCIS is 6.0 mm from the nearest margin (posterior). -   Nipple and skin negative for carcinoma. -   Complex sclerosing lesion, focally involved by DCIS. -   Intraductal papillomata, focally involved by DCIS. 6.  Internal mammary lymph node, right, biopsy -   Three lymph nodes negative for metastatic carcinoma (0/3). 7.  Internal mammary lymph node, left, biopsy -   One lymph node negative for metastatic carcinoma (0/1).  Genetic testing Ambry negative  Dr. Cueva prescribed dose-dense AC followed by Taxol chemotherapy, initiated on November 19, 2024, and completed on March 6, 2025.   She tolerated well, but has some neuropathy and alopecia.  Dr. Cueva also recommends a 10-year course of an aromatase inhibitor. The addition of a CDK 4/6 inhibitor for 2-3 years will be considered after radiation therapy.  3/13/25: Patient presents for followup.  She tolerated chemotherapy well, denying fatigue, pain, swelling, numbness/tingling, signs/symptoms of infection, nausea, or vomiting. She continues to follow with Dr. Pelayo (next appointment 04/21/25) and Dr. Cueva (next appointment 03/24/25). She is considering cosmetic revision with Dr. Benito after completing radiation therapy.  In Summary: 61 y/o postmenopausal female with stage IIA, [id number], mod diff invasive ductal ca of LEFT breast LOQ, ER/NV+, Her2 neg. She has undergone mastectomy, ALND, and YESICA reconstruction. She has completed dose dense AC and T chemotx. Patient is at risk for locoregional disease recurrence given the 4 positive LEFT axillary LN's with DEE. Recommended post mastectomy adjuvant RT. Plan for 50 Gy in 25 fx to left anterior chest wall and draining lissa regions (to include axilla/supraclav/IMN's).  Patient is currently undergoing post mastectomy adjuvant RT  04/07/2025 Patient presents for OTV, fx 4/25 to Left breast and nodes. Reports doing well, denies fatigue, breast or skin changes. Moisturizing with Lubriderm.   04/14/2025 Patient presents for OTV, fx 9/20 to left breast and nodes. Patient denies breast/axilla soreness, pain, skin changes such as hyperpigmentation or erythema, nipple sensitivity, breast swelling, or new masses. Using Lubriderm to moisturize BID.   04/23/2025 Patient presents for OTV, fx 16/25 to left breast/nodes. Tolerating RT well. The patient reports no breast or axilla pain, soreness, nipple sensitivity, swelling, or new masses. She is using Lubriderm moisturizer twice a day.

## 2025-04-28 NOTE — PHYSICAL EXAM
[FreeTextEntry1] : WDWN.  In no acute distress. Skin in RT portals intact. Miold moderate erythema. Mild rash.

## 2025-04-28 NOTE — HISTORY OF PRESENT ILLNESS
[FreeTextEntry1] : Ms. Elizalde presents today for consideration for radiation therapy for breast cancer, referred by Dr. Pelayo.  Bilateral breast ultrasound done on 7/31/2024 showed: In the area of palpable concern in the left 4 oclock axis, 5-7 cm from the nipple there are 2 irregular hypoechoic solid masses measuring 1.5 and 1.7 cm with suspicious morphologic features corresponding with the spiculated masses on mammography. Abnormal hypoechoic left axillary node.   8/5/2024 left breast biopsy showed: 1.  Breast, core biopsy (LEFT, 5:00):      -   INFILTRATING DUCTAL CARCINOMA, Disha score 3+ 2+ 2 = 7/9         ER/MS+, Her2 neg. 2.  Lymph node, core biopsy (LEFT axillary):      -   METASTATIC CARCINOMA.  PET scan 8/20/2024 showed: 1. FDG avid left breast cancer and metastatic left axillary lymphadenopathy. No distant FDG avid disease. 2. Incidental note of FDG avid nodule in the region of the posterior left thyroid. Recommend correlation with ultrasound to evaluate for signs of thyroid malignancy.  thyroid ultrasound 8/28/2024 showed: Multiple thyroid nodules consistent with multinodular goiter. A 1.3 cm isoechoic nodule in the mid to lower pole of the left lobe which likely corresponds to the FDG avid lesion seen on the prior PET/CT. Since it is FDG avid, consider further evaluation with ultrasound-guided fine-needle aspiration  10/10/2024 she underwent bilateral total mastectomy (Dr Pelayo) and breast reconstruction with YESICA flaps (Dr Benito). Pathology revealed: 1.  Breast, right, total mastectomy-  Benign 2.  Lymph node, left axilla, biopsy -   One lymph node negative for metastatic carcinoma (0/1). 3.  Axillary fat pad, left, dissection -   Four of twelve lymph nodes positive for metastatic carcinoma (4/12). -   The largest metastatic focus measures 22.0 mm in greatest dimension. -   Extranodal extension is present (extent 5.0 mm). 4.  Breast, left, posterior margin, biopsy -   Benign fibroadipose tissue. 5.  Breast, left, total mastectomy -   Invasive ductal carcinoma, Disha grade 2 (tubule formation: 3, nuclear pleomorphism: 2, mitotic rate: 2; total score 7/9). -   The invasive tumor measures 37.0 mm in greatest dimension. -   Ductal carcinoma in situ, nuclear grade 2, cribriform, micropapillary and papillary types with necrosis and calcifications.  The DCIS spans a distance of approximately 40.0 mm in greatest dimension and is present in 4 of 9 slides. -   No lymphovascular invasion identified. -   The resection margins are negative for carcinoma.  The invasive tumor is 2.0 mm from the nearest margin (posterior) and 3.0 mm from the anterior margin.  DCIS is 6.0 mm from the nearest margin (posterior). -   Nipple and skin negative for carcinoma. -   Complex sclerosing lesion, focally involved by DCIS. -   Intraductal papillomata, focally involved by DCIS. 6.  Internal mammary lymph node, right, biopsy -   Three lymph nodes negative for metastatic carcinoma (0/3). 7.  Internal mammary lymph node, left, biopsy -   One lymph node negative for metastatic carcinoma (0/1).  Genetic testing Ambry negative  Dr. Cueva prescribed dose-dense AC followed by Taxol chemotherapy, initiated on November 19, 2024, and completed on March 6, 2025.   She tolerated well, but has some neuropathy and alopecia.  Dr. Cueva also recommends a 10-year course of an aromatase inhibitor. The addition of a CDK 4/6 inhibitor for 2-3 years will be considered after radiation therapy.  3/13/25: Patient presents for followup.  She tolerated chemotherapy well, denying fatigue, pain, swelling, numbness/tingling, signs/symptoms of infection, nausea, or vomiting. She continues to follow with Dr. Pelayo (next appointment 04/21/25) and Dr. Cueva (next appointment 03/24/25). She is considering cosmetic revision with Dr. Benito after completing radiation therapy.  In Summary: 59 y/o postmenopausal female with stage IIA, [id number], mod diff invasive ductal ca of LEFT breast LOQ, ER/MS+, Her2 neg. She has undergone mastectomy, ALND, and YESICA reconstruction. She has completed dose dense AC and T chemotx. Patient is at risk for locoregional disease recurrence given the 4 positive LEFT axillary LN's with DEE. Recommended post mastectomy adjuvant RT. Plan for 50 Gy in 25 fx to left anterior chest wall and draining lissa regions (to include axilla/supraclav/IMN's).  Patient is currently undergoing post mastectomy adjuvant RT  04/07/2025 Patient presents for OTV, fx 4/25 to Left breast and nodes. Reports doing well, denies fatigue, breast or skin changes. Moisturizing with Lubriderm.   04/14/2025 Patient presents for OTV, fx 9/20 to left breast and nodes. Patient denies breast/axilla soreness, pain, skin changes such as hyperpigmentation or erythema, nipple sensitivity, breast swelling, or new masses. Using Lubriderm to moisturize BID.   04/23/2025 Patient presents for OTV, fx 16/25 to left breast/nodes. Tolerating RT well. The patient reports no breast or axilla pain, soreness, nipple sensitivity, swelling, or new masses. She is using Lubriderm moisturizer twice a day.  04/28/2025 Patient presents for OTV, fx 19/25 to left breast/nodes. Experiencing mild erythema/hyperpigmentation with a mild non pruritic non bothersome rash. Denies any breast/axilla pain, soreness, nipple sensitivity, swelling or new masses. Continues to moisturize with Lubriderm BID.

## 2025-04-28 NOTE — DISEASE MANAGEMENT
[Pathological] : TNM Stage: p [IIA] : IIA [FreeTextEntry4] : LEFT breast cancer, ER/AK+, Her2 neg [TTNM] : 2 [NTNM] : 2a [MTNM] : 0 [de-identified] : 0393 [de-identified] : 4647 [de-identified] : Left Breast/Nodes

## 2025-04-29 NOTE — HISTORY OF PRESENT ILLNESS
[FreeTextEntry1] : Patient returns to the office for interval assessment  She has a history for left breast cancer  She feels well  Denies palpable mass, pain, skin thickening

## 2025-04-29 NOTE — PROCEDURE
[FreeTextEntry3] : Bilateral breast ultrasound  The patient has a history for left breast cancer  Four-quadrant survey the left breast demonstrates no developing mass  Four-quadrant survey the right breast demonstrates no developing mass  BI-RADS 2

## 2025-04-29 NOTE — ASSESSMENT
[FreeTextEntry1] : History for left breast cancer  No evidence of recurrence clinically or on ultrasound  Follow-up 6 months  A total of 20 minutes was spent consultation

## 2025-05-05 NOTE — DISEASE MANAGEMENT
[Pathological] : TNM Stage: p [IIA] : IIA [FreeTextEntry4] : LEFT breast cancer, ER/AK+, Her2 neg [TTNM] : 2 [NTNM] : 2a [MTNM] : 0 [de-identified] : 9616 [de-identified] : 0140 [de-identified] : Left Breast/Nodes

## 2025-05-05 NOTE — HISTORY OF PRESENT ILLNESS
[FreeTextEntry1] : Ms. Elizalde presents today for consideration for radiation therapy for breast cancer, referred by Dr. Pelayo.  Bilateral breast ultrasound done on 7/31/2024 showed: In the area of palpable concern in the left 4 oclock axis, 5-7 cm from the nipple there are 2 irregular hypoechoic solid masses measuring 1.5 and 1.7 cm with suspicious morphologic features corresponding with the spiculated masses on mammography. Abnormal hypoechoic left axillary node.   8/5/2024 left breast biopsy showed: 1.  Breast, core biopsy (LEFT, 5:00):      -   INFILTRATING DUCTAL CARCINOMA, Disha score 3+ 2+ 2 = 7/9         ER/IL+, Her2 neg. 2.  Lymph node, core biopsy (LEFT axillary):      -   METASTATIC CARCINOMA.  PET scan 8/20/2024 showed: 1. FDG avid left breast cancer and metastatic left axillary lymphadenopathy. No distant FDG avid disease. 2. Incidental note of FDG avid nodule in the region of the posterior left thyroid. Recommend correlation with ultrasound to evaluate for signs of thyroid malignancy.  thyroid ultrasound 8/28/2024 showed: Multiple thyroid nodules consistent with multinodular goiter. A 1.3 cm isoechoic nodule in the mid to lower pole of the left lobe which likely corresponds to the FDG avid lesion seen on the prior PET/CT. Since it is FDG avid, consider further evaluation with ultrasound-guided fine-needle aspiration  10/10/2024 she underwent bilateral total mastectomy (Dr Pelayo) and breast reconstruction with YESICA flaps (Dr Benito). Pathology revealed: 1.  Breast, right, total mastectomy-  Benign 2.  Lymph node, left axilla, biopsy -   One lymph node negative for metastatic carcinoma (0/1). 3.  Axillary fat pad, left, dissection -   Four of twelve lymph nodes positive for metastatic carcinoma (4/12). -   The largest metastatic focus measures 22.0 mm in greatest dimension. -   Extranodal extension is present (extent 5.0 mm). 4.  Breast, left, posterior margin, biopsy -   Benign fibroadipose tissue. 5.  Breast, left, total mastectomy -   Invasive ductal carcinoma, Disha grade 2 (tubule formation: 3, nuclear pleomorphism: 2, mitotic rate: 2; total score 7/9). -   The invasive tumor measures 37.0 mm in greatest dimension. -   Ductal carcinoma in situ, nuclear grade 2, cribriform, micropapillary and papillary types with necrosis and calcifications.  The DCIS spans a distance of approximately 40.0 mm in greatest dimension and is present in 4 of 9 slides. -   No lymphovascular invasion identified. -   The resection margins are negative for carcinoma.  The invasive tumor is 2.0 mm from the nearest margin (posterior) and 3.0 mm from the anterior margin.  DCIS is 6.0 mm from the nearest margin (posterior). -   Nipple and skin negative for carcinoma. -   Complex sclerosing lesion, focally involved by DCIS. -   Intraductal papillomata, focally involved by DCIS. 6.  Internal mammary lymph node, right, biopsy -   Three lymph nodes negative for metastatic carcinoma (0/3). 7.  Internal mammary lymph node, left, biopsy -   One lymph node negative for metastatic carcinoma (0/1).  Genetic testing Ambry negative  Dr. Cueva prescribed dose-dense AC followed by Taxol chemotherapy, initiated on November 19, 2024, and completed on March 6, 2025.   She tolerated well, but has some neuropathy and alopecia.  Dr. Cueva also recommends a 10-year course of an aromatase inhibitor. The addition of a CDK 4/6 inhibitor for 2-3 years will be considered after radiation therapy.  3/13/25: Patient presents for followup.  She tolerated chemotherapy well, denying fatigue, pain, swelling, numbness/tingling, signs/symptoms of infection, nausea, or vomiting. She continues to follow with Dr. Pelayo (next appointment 04/21/25) and Dr. Cueva (next appointment 03/24/25). She is considering cosmetic revision with Dr. Benito after completing radiation therapy.  In Summary: 61 y/o postmenopausal female with stage IIA, [id number], mod diff invasive ductal ca of LEFT breast LOQ, ER/IL+, Her2 neg. She has undergone mastectomy, ALND, and YESICA reconstruction. She has completed dose dense AC and T chemotx. Patient is at risk for locoregional disease recurrence given the 4 positive LEFT axillary LN's with DEE. Recommended post mastectomy adjuvant RT. Plan for 50 Gy in 25 fx to left anterior chest wall and draining lsisa regions (to include axilla/supraclav/IMN's).  Patient is currently undergoing post mastectomy adjuvant RT  04/07/2025 Patient presents for OTV, fx 4/25 to Left breast and nodes. Reports doing well, denies fatigue, breast or skin changes. Moisturizing with Lubriderm.   04/14/2025 Patient presents for OTV, fx 9/20 to left breast and nodes. Patient denies breast/axilla soreness, pain, skin changes such as hyperpigmentation or erythema, nipple sensitivity, breast swelling, or new masses. Using Lubriderm to moisturize BID.   04/23/2025 Patient presents for OTV, fx 16/25 to left breast/nodes. Tolerating RT well. The patient reports no breast or axilla pain, soreness, nipple sensitivity, swelling, or new masses. She is using Lubriderm moisturizer twice a day.  04/28/2025 Patient presents for OTV, fx 19/25 to left breast/nodes. Experiencing mild erythema/hyperpigmentation with a mild non pruritic non bothersome rash. Denies any breast/axilla pain, soreness, nipple sensitivity, swelling or new masses. Continues to moisturize with Lubriderm BID.    05/05/2025 Patient presents for OTV, fx 23/25 to Left breast/nodes. She continues to experience mild redness and darkening of the skin, accompanied by a mild, non-itchy, and asymptomatic rash. Denies any breast or axilla pain, soreness, nipple sensitivity, swelling, or new lumps. Moisturizes twice daily with Lubriderm.

## 2025-05-13 NOTE — REVIEW OF SYSTEMS
"[facility]        Dyllan Palma MD, MPH  01/28/2017        History:      Nicole Galdamez is a 56 year old female with a chief complaint of sore throat.  Onset of symptoms was 5 day(s) ago.    No dyspnea or wheezing or cough  No vomiting    No diarrhea  No abdominal pain  Eating and drinking well  Making urine well         Assessment and Plan:        Acute pharyngitis:  - Strep, Rapid Screen  - Beta strep group A culture  - azithromycin (ZITHROMAX) 250 MG tablet; Two tablets first day, then one tablet daily for four days.  Dispense: 6 tablet; Refill: 0  Advised patient to increase fluid intake and rest..  Tylenol  Every 6 hours as needed alternating with ibuprofen every 6 hours as needed for pain and fever.  F/u w PCP in 1 week, earlier if symptoms worsen.                   Physical Exam:      /76 mmHg  Pulse 70  Temp(Src) 97.2  F (36.2  C) (Tympanic)  Ht 5' 3\" (1.6 m)  Wt 165 lb (74.844 kg)  BMI 29.24 kg/m2  SpO2 97%  LMP 04/04/2014     Constitutional: Patient is in no distress The patient is pleasant and cooperative.   HEENT: Head:  Head is atraumatic, normocephalic.    Eyes: Pupils are equal, round and reactive to light and accomodation.  Sclera is non-icteric. No conjunctival injection, or exudate noted. Extraocular motion is intact. Visual acuity is intact bilaterally.  Ears:  External acoustic canals are patent and clear.  There is no erythema and bulging( exudate)  of the ( R/L ) tympanic membrane(s ).   Nose:  Nasal congestion w/o drainage or mucosal ulceration is noted.  Throat:  Oral mucosa is moist.  No oral lesions are noted.  Posterior pharyngeal hyperemia w/o exudate noted.     Neck Supple.  There is no cervical lymphadenopathy.  No nuchal rigidity noted.  There is no meningismus.     Cardiovascular:  Chest Wall: Heart is regular to rate and rhythm.  No murmur is noted.       Lungs: Clear in the anterior and posterior pulmonary fields.   Abdomen: Soft and " non-tender.    Back No flank tenderness is noted.   Extremeties No edema, no calf tenderness.   Neuro: No focal deficit.   Skin No petechiae or purpura is noted.  There is no rash.   Mood Normal              Data:      All new lab and imaging data was reviewed.   Results for orders placed or performed in visit on 01/28/17   Strep, Rapid Screen   Result Value Ref Range    Specimen Description Throat     Rapid Strep A Screen       NEGATIVE: No Group A streptococcal antigen detected by immunoassay, await   culture report.      Micro Report Status FINAL 01/28/2017                                      [Negative] : Heme/Lymph [Skin Hyperpigmentation: Grade 1 - Hyperpigmentation covering <10% BSA; no psychosocial impact] : Skin Hyperpigmentation: Grade 1 - Hyperpigmentation covering <10% BSA; no psychosocial impact [Dermatitis Radiation: Grade 0] : Dermatitis Radiation: Grade 0 [de-identified] : hyperpigmentation to LEFT chest wall and LEFT axilla

## 2025-05-13 NOTE — HISTORY OF PRESENT ILLNESS
[Disease: _____________________] : Disease: [unfilled] [T: ___] : T[unfilled] [N: ___] : N[unfilled] [AJCC Stage: ____] : AJCC Stage: [unfilled] [de-identified] : 59 y/o female with recently diagnoseed let breast cancer. Presented with an abnormal screening mammogram   Presented in July 2024 with a palpable mass in the left breast. Mammogram  and sono 7/31/2024: In the area of palpable concern in the left 4 oclock axis, 5-7 cm from the nipple there are 2 irregular hypoechoic solid masses measuring 1.5 and 1.7 cm with suspicious morphologic features corresponding with the spiculated masses on mammography. Abnormal hypoechoic left axillary node.  8/5/2024 left breast core biopsy (LEFT, 5:00):  - INFILTRATING DUCTAL CARCINOMA, [place] score 3+ 2+ 2 = 7/9   ER/OK+, Her2 neg. 2. Lymph node, core biopsy (LEFT axillary):  - METASTATIC CARCINOMA.  PET scan 8/20/2024 showed: 1. FDG avid left breast cancer and metastatic left axillary lymphadenopathy. No distant FDG avid disease. 2. Incidental note of FDG avid nodule in the region of the posterior left thyroid. Recommend correlation with ultrasound to evaluate for signs of thyroid malignancy.  thyroid ultrasound 8/28/2024 :  Multiple thyroid nodules consistent with multinodular goiter. A 1.3 cm isoechoic nodule in the mid to lower pole of the left lobe which likely corresponds to the FDG avid lesion seen on the prior PET/CT. Since it is FDG avid, consider further evaluation with ultrasound-guided fine-needle aspiration  10/10/2024 she underwent bilateral  mastectomy (Dr Pelayo) and breast reconstruction with YESICA flaps (Dr Benito).  Pathology revealed: Breast, right, total mastectomy- Benign Lymph node, left axilla, biopsy - One lymph node negative for metastatic carcinoma (0/1). Axillary fat pad, left, dissection  - Four of twelve lymph nodes positive for metastatic carcinoma  (4/12). - The largest metastatic focus measures 22.0 mm in greatest dimension. - Extranodal extension is present (extent 5.0 mm). Breast, left, total mastectomy - Invasive ductal carcinoma, [place] grade 2 (tubule formation: 3, nuclear pleomorphism: 2, mitotic rate: 2; total score 7/9). - The invasive tumor measures 37.0 mm in greatest dimension. - Ductal carcinoma in situ, nuclear grade 2, cribriform, micropapillary and papillary types with necrosis and calcifications.  - The resection margins are negative for carcinoma. The invasive tumor is 2.0 mm from the nearest margin (posterior) and 3.0 mm from the anterior margin. DCIS is 6.0 mm from the nearest margin (posterior). Internal mammary lymph node, right, biopsy:   Internal mammary lymph node, left, biopsy - One lymph node negative for metastatic carcinoma (0/1).   medical Hx : HTN and hyperlipidemia. LIves at home with her . Works as a .   11/19/24- 3/6/25  DD AC- T   Adjuvant  post mastectomy  RT to the LEFT chest wall/nodes, 5000 cGy in 25 fractions, from 4/2/25 to 5/8/25. ( Dr Leo ).  Anastrozole to start after RT.   [de-identified] : invasive ductal cancer grade 2 ( 7/9)  [de-identified] : ER   IA   HER2  0  negative  [de-identified] : Genetic testing Ambry  negative  [de-identified] : Completed RT last week. mild dermatitis- improving.  Overall feels very well.  She has anastrozole - but per Dr Leo can start two weeks after RT ( next week)  Expecting their  first  grandchild in November.

## 2025-05-13 NOTE — HISTORY OF PRESENT ILLNESS
[FreeTextEntry1] : Ms. Elizalde presents today for consideration for radiation therapy for breast cancer, referred by Dr. Pelayo.  Bilateral breast ultrasound done on 7/31/2024 showed: In the area of palpable concern in the left 4 oclock axis, 5-7 cm from the nipple there are 2 irregular hypoechoic solid masses measuring 1.5 and 1.7 cm with suspicious morphologic features corresponding with the spiculated masses on mammography. Abnormal hypoechoic left axillary node.   8/5/2024 left breast biopsy showed: 1.  Breast, core biopsy (LEFT, 5:00):      -   INFILTRATING DUCTAL CARCINOMA, Disha score 3+ 2+ 2 = 7/9         ER/NM+, Her2 neg. 2.  Lymph node, core biopsy (LEFT axillary):      -   METASTATIC CARCINOMA.  PET scan 8/20/2024 showed: 1. FDG avid left breast cancer and metastatic left axillary lymphadenopathy. No distant FDG avid disease. 2. Incidental note of FDG avid nodule in the region of the posterior left thyroid. Recommend correlation with ultrasound to evaluate for signs of thyroid malignancy.  thyroid ultrasound 8/28/2024 showed: Multiple thyroid nodules consistent with multinodular goiter. A 1.3 cm isoechoic nodule in the mid to lower pole of the left lobe which likely corresponds to the FDG avid lesion seen on the prior PET/CT. Since it is FDG avid, consider further evaluation with ultrasound-guided fine-needle aspiration  10/10/2024 she underwent bilateral total mastectomy (Dr Pelayo) and breast reconstruction with YESICA flaps (Dr Benito). Pathology revealed: 1.  Breast, right, total mastectomy-  Benign 2.  Lymph node, left axilla, biopsy -   One lymph node negative for metastatic carcinoma (0/1). 3.  Axillary fat pad, left, dissection -   Four of twelve lymph nodes positive for metastatic carcinoma (4/12). -   The largest metastatic focus measures 22.0 mm in greatest dimension. -   Extranodal extension is present (extent 5.0 mm). 4.  Breast, left, posterior margin, biopsy -   Benign fibroadipose tissue. 5.  Breast, left, total mastectomy -   Invasive ductal carcinoma, Disha grade 2 (tubule formation: 3, nuclear pleomorphism: 2, mitotic rate: 2; total score 7/9). -   The invasive tumor measures 37.0 mm in greatest dimension. -   Ductal carcinoma in situ, nuclear grade 2, cribriform, micropapillary and papillary types with necrosis and calcifications.  The DCIS spans a distance of approximately 40.0 mm in greatest dimension and is present in 4 of 9 slides. -   No lymphovascular invasion identified. -   The resection margins are negative for carcinoma.  The invasive tumor is 2.0 mm from the nearest margin (posterior) and 3.0 mm from the anterior margin.  DCIS is 6.0 mm from the nearest margin (posterior). -   Nipple and skin negative for carcinoma. -   Complex sclerosing lesion, focally involved by DCIS. -   Intraductal papillomata, focally involved by DCIS. 6.  Internal mammary lymph node, right, biopsy -   Three lymph nodes negative for metastatic carcinoma (0/3). 7.  Internal mammary lymph node, left, biopsy -   One lymph node negative for metastatic carcinoma (0/1).  Genetic testing Ambry negative  Dr. Cueva prescribed dose-dense AC followed by Taxol chemotherapy, initiated on November 19, 2024, and completed on March 6, 2025.   She tolerated well, but has some neuropathy and alopecia.  Dr. Cueva also recommends a 10-year course of an aromatase inhibitor. The addition of a CDK 4/6 inhibitor for 2-3 years will be considered after radiation therapy.  3/13/25: Patient presents for followup.  She tolerated chemotherapy well, denying fatigue, pain, swelling, numbness/tingling, signs/symptoms of infection, nausea, or vomiting. She continues to follow with Dr. Pelayo (next appointment 04/21/25) and Dr. Cueva (next appointment 03/24/25). She is considering cosmetic revision with Dr. Benito after completing radiation therapy.  In Summary: 61 y/o postmenopausal female with stage IIA, [id number], mod diff invasive ductal ca of LEFT breast LOQ, ER/NM+, Her2 neg. She has undergone mastectomy, ALND, and YESICA reconstruction. She has completed dose dense AC and T chemotx. Patient is at risk for locoregional disease recurrence given the 4 positive LEFT axillary LN's with DEE. Recommended post mastectomy adjuvant RT. Plan for 50 Gy in 25 fx to left anterior chest wall and draining lissa regions (to include axilla/supraclav/IMN's).  She completed post mastectomy adjuvant RT to the LEFT chest wall/nodes, 5000 cGy in 25 fractions, from 4/2/25 to 5/8/25.    05/13/2025 Patient presents today for post treatment evaluation.  Patient reports increased fatigue and earlier sleep onset. She denies breast/axillary soreness, pain, swelling, or new observed masses. She sustained a fall on her right side one week ago while walking her dog, resulting in right rib/chest pain. Moderate erythema and hyperpigmentation is noted on the left chest wall and axilla, with complete re-epithelialization and no residual dequamation at inframammary fold. The patient will begin Anastrozole on May 21, 2024, and has expressed interest in consulting Dr. Benito regarding breast symmetry/reduction and lipoma (to her back) removal. She will see Dr. Cueva later today.

## 2025-05-13 NOTE — REASON FOR VISIT
[Follow-Up Visit] : a follow-up [Spouse] : spouse [FreeTextEntry2] : breast cancer   on adjuvant therapy

## 2025-05-13 NOTE — PHYSICAL EXAM
[Normal] : oriented to person, place and time, the affect was normal, the mood was normal and not anxious [de-identified] : Skin intact, with moderate erythema and hyperpigmentation to LEFT chest wall and LEFT axilla. Re-epithelialization is complete to the inframammary fold, with no residual desquamation.

## 2025-05-13 NOTE — ASSESSMENT
[FreeTextEntry1] : 61 y/o female with left breast cancer - invasive ductal  mod diff strongly ER and UT positive, HER 2 negative , metastatic to 4/ 17  axillary  LNs with extracapsular extension p T2  pN2a  prognostic stage II A s/p bilateral mastectomies L ALND and YESICA reconstruction on 10/10/24 ( Dr Pelayo, Dr Benito)   High risk of systemic recurrence. Plan :  Adjuvant chemotherapy DD AC-T followed by adjuvant  radiation, hormonal therapy - AI x 10 years  and CDK4/6 inhibitor x 2-3 years.  11/19/24  started DD ACT  Completed chemotherapy ( Taxol 4/4) on 3/6/25..  Completed postmastetcomy RT to left chest wall and regional nodes on 5/8/25  ( Dr Leo)   She will start anastrozole next week.  Discussed  adjuvant hormonal therapy- discussed potential side effects of AI.   Discussed adjuvant  CDK4/6 inhibitor.  Plan:  ribociclib 400 mg daily day 1-21 q 28 x 3 years : will start  in June  Discussed in details side effects and monitoring  CBC/CMP/ LFT's Mg Phosph q 2 weeks x 2 months next monthly x 4 next as needed. ECG every 2 weeks x 2 next as needed baseline ECG is normal.  Discussed also  clinical trial with camizestrant versus standard adjuvant endocrine therapy in high risk ER positive HEr2 negative breast cancer.  Patient is not interested in trial participation.  She will need baseline bone density later this year ( not urgent).  She will call us when she  has Kisqali delivered- we will schedule follow up appointments accordingly.

## 2025-05-13 NOTE — DISEASE MANAGEMENT
[Pathological] : TNM Stage: p [IIA] : IIA [FreeTextEntry4] : LEFT breast cancer, ER/CT+, Her2 neg [TTNM] : 2 [NTNM] : 2a [MTNM] : 0 [de-identified] : 6523 [de-identified] : 9111 [de-identified] : Left Breast/Nodes

## 2025-05-13 NOTE — PHYSICAL EXAM
[Normal] : affect appropriate [de-identified] : looks well  [de-identified] : no mucositis   mild hair loss   [de-identified] : no lymphedema  ; mediport   removed [de-identified] : s/p bilat mastectomies with YESICA reconstruction and L ALND [de-identified] : no lymphedema  [de-identified] : mild erythema post RT L chest wall improving

## 2025-06-06 NOTE — HISTORY OF PRESENT ILLNESS
[FreeTextEntry1] : NPA-CPE [de-identified] : 61 year F presents for annual physical exam. PMH breast cancer, s/p mastectomy, HLD, HTN, obesity Feeling well with no complaints. May require reconstructive surgery in the fall Following with Onc- just started Anastrozole on 5/21/25- no side effects  started lipitor 7/23, LDL 200s started losartan 50mg 8/24.   She wants to wean off of meds if possible

## 2025-06-06 NOTE — ASSESSMENT
[Vaccines Reviewed] : Immunizations reviewed today. Please see immunization details in the vaccine log within the immunization flowsheet.  [FreeTextEntry1] : rpa in 4- 6mos

## 2025-06-06 NOTE — HEALTH RISK ASSESSMENT
[Yes] : Yes [Monthly or less (1 pt)] : Monthly or less (1 point) [1 or 2 (0 pts)] : 1 or 2 (0 points) [Never (0 pts)] : Never (0 points) [No] : In the past 12 months have you used drugs other than those required for medical reasons? No [0] : 2) Feeling down, depressed, or hopeless: Not at all (0) [PHQ-2 Negative - No further assessment needed] : PHQ-2 Negative - No further assessment needed [Patient reported mammogram was abnormal] : Patient reported mammogram was abnormal [Never] : Never [Excellent] : ~his/her~  mood as  excellent [de-identified] : walking [Audit-CScore] : 1 [de-identified] : small meals [[id number]] : 0 [EyeExamDate] : 01/2023 [# Of Children ___] : has [unfilled] children [Fully functional (bathing, dressing, toileting, transferring, walking, feeding)] : Fully functional (bathing, dressing, toileting, transferring, walking, feeding) [Fully functional (using the telephone, shopping, preparing meals, housekeeping, doing laundry, using] : Fully functional and needs no help or supervision to perform IADLs (using the telephone, shopping, preparing meals, housekeeping, doing laundry, using transportation, managing medications and managing finances) [Reports changes in hearing] : Reports no changes in hearing [Reports changes in vision] : Reports no changes in vision [Reports changes in dental health] : Reports no changes in dental health [MammogramDate] : 07/2024 [BoneDensityDate] : 01/2023 [BoneDensityComments] : calcium deficient  [ColonoscopyDate] : 2024 [ColonoscopyComments] : pt declines colonoscopy

## 2025-06-26 NOTE — HISTORY OF PRESENT ILLNESS
[FreeTextEntry1] : Ms. GRACE GRAY  is a 60 year  old female  with past medical history of a thyroid mass, HTN and high cholesterol who presents with newly diagnosed left breast cancer.  Pt was diagnosed on a screening mammogram August 2024, and later confirmed with ultrasound guided core biopsy 8/5/24. pt was referred to the office by Dr Pelayo to discuss her reconstructive options.   She is now s/p bilateral breast reconstruction with YESICA flaps 10/10/24 Doing well, she presents today to discuss revision options. She has finished all of her chemotherapy treatments and radiation treatments. Her last radiation treatment was beginning of May She additionally has a back mass that has been very large and concerning for her. She is worried about its etiology and would like to discuss excision.

## 2025-06-26 NOTE — PHYSICAL EXAM
[NI] : Normal [de-identified] : bilateral breasts soft  left breast slightly larger than right, minimal radiation changes  no signs of infection or palpable fluid collections noted incisions c/d/i breast skin flaps with normal capillary refill and warm [de-identified] : incisions c/d/i abdomen soft and nontender umbilicus viable no palpable fluid collections or signs of infection noted skin with normal capillary refill, skin warm to the touch [de-identified] : back mass - 7 cm soft mobile  non tender well circumscribed

## 2025-06-26 NOTE — ASSESSMENT
[FreeTextEntry1] : Patient is doing well and healing as expected   Discussed revision options with patient. First, she will need to wait a minimum of 3 months before we return to the operating room for breast revision due to radiation changes. The tentative plan is to perform bilateral revision of reconstructed breasts with bilateral breast reduction, and abdominal donor site revision with excision of dog ears. Patient is happy with this plan and will continue to heal.  Additionally, the patient will be scheduled for back mass extirpation.  I explained that following extirpation there will be a full thickness defect of the involved area.  The reconstructive options will be based on the defect size and surrounding tissue laxity of the involved area.  Primary closure is only possible for smaller defects. The patient was explained the risks. Risks following layered primary closure or local tissue rearrangement include wound dehiscence, contour irregularity, bleeding, infection, and parasthesia. The patient understands all the risks and has provided informed consent and wishes to proceed with the back mass excision first.   she is encouraged to call if there are any changes May proceed with back mass surgical scheduling  all pt questions answered